# Patient Record
Sex: MALE | Race: WHITE | Employment: FULL TIME | URBAN - METROPOLITAN AREA
[De-identification: names, ages, dates, MRNs, and addresses within clinical notes are randomized per-mention and may not be internally consistent; named-entity substitution may affect disease eponyms.]

---

## 2017-05-03 NOTE — TELEPHONE ENCOUNTER
----- Message from [phone] sent at 5/3/2017  3:16 PM EDT -----  Regarding: Dr. Lyon Ponto: [phone]  Dad called stating pt has to switch from Novolog to Humalog because insurance does not cover it. Please call sneha [phone].

## 2017-05-03 NOTE — TELEPHONE ENCOUNTER
Spoke to the parent of Jeff Kerr. Informed father I would forward a refill request to . Encouraged father to set appt with MD since Jeff Kerr has not been seen since 8/16. Father verbalized understanding.

## 2017-05-04 RX ORDER — INSULIN LISPRO 100 [IU]/ML
INJECTION, SOLUTION INTRAVENOUS; SUBCUTANEOUS
Qty: 30 ML | Refills: 1 | Status: SHIPPED | OUTPATIENT
Start: 2017-05-04 | End: 2017-09-14 | Stop reason: SDUPTHER

## 2017-07-17 ENCOUNTER — APPOINTMENT (OUTPATIENT)
Dept: GENERAL RADIOLOGY | Age: 22
End: 2017-07-17
Attending: FAMILY MEDICINE

## 2017-07-17 ENCOUNTER — HOSPITAL ENCOUNTER (EMERGENCY)
Age: 22
Discharge: HOME | End: 2017-07-17
Attending: FAMILY MEDICINE

## 2017-07-17 VITALS
HEART RATE: 97 BPM | RESPIRATION RATE: 16 BRPM | DIASTOLIC BLOOD PRESSURE: 79 MMHG | WEIGHT: 141 LBS | BODY MASS INDEX: 20.19 KG/M2 | SYSTOLIC BLOOD PRESSURE: 148 MMHG | OXYGEN SATURATION: 98 % | TEMPERATURE: 97.5 F | HEIGHT: 70 IN

## 2017-07-17 DIAGNOSIS — M25.512 PAIN IN JOINT OF LEFT SHOULDER: Primary | ICD-10-CM

## 2017-07-17 NOTE — DISCHARGE INSTRUCTIONS
Arm Pain in Children: Care Instructions  Your Care Instructions  Your child can hurt his or her arm by using it too much or by injuring it. Biking and wrestling are examples of activities that can lead to arm pain. Everyday wear and tear, especially as your child gets older, can cause arm pain. Your child's forearms, wrists, hands, and fingers are the parts of the arm that are most likely to become painful. A minor arm injury usually will heal on its own with home treatment to relieve swelling and pain. If your child has a more serious injury, he or she may need tests and treatment. Follow-up care is a key part of your child's treatment and safety. Be sure to make and go to all appointments, and call your doctor if your child is having problems. It's also a good idea to know your child's test results and keep a list of the medicines your child takes. How can you care for your child at home? · Give pain medicines exactly as directed. ¨ If the doctor gave your child a prescription medicine for pain, give it as prescribed. ¨ If your child is not taking a prescription pain medicine, ask your doctor if your child can take an over-the-counter medicine. · Make sure your child rests and protects the arm. Have your child take a break from any activity that may cause pain. · Put ice or a cold pack on the arm for 10 to 20 minutes at a time. Put a thin cloth between the ice and your child's skin. · Prop up the sore arm on a pillow when icing it or anytime your child sits or lies down during the next 3 days. Try to keep the arm above the level of your child's heart. This will help reduce swelling. · If your doctor recommends a sling to support the arm, make sure your child wears it as directed. When should you call for help? Call your doctor now or seek immediate medical care if:  · Your child's arm or hand is cool or pale or changes color. · Your child cannot use the arm.   · Your child has signs of infection, such as:  ¨ Increased pain, swelling, warmth, or redness. ¨ Red streaks running up or down the arm. ¨ Pus draining from an area of the arm. ¨ A fever. · Your child has tingling, weakness, or numbness in the arm. Watch closely for changes in your child's health, and be sure to contact your doctor if:  · Your child does not get better as expected. Where can you learn more? Go to http://michael-elaine.info/. Enter [id number] in the search box to learn more about \"Arm Pain in Children: Care Instructions. \"  Current as of: March 20, 2017  Content Version: 11.3  © 1694-3709 [employer]. Care instructions adapted under license by [employer] (which disclaims liability or warranty for this information). If you have questions about a medical condition or this instruction, always ask your healthcare professional. [address] any warranty or liability for your use of this information.

## 2017-07-24 ENCOUNTER — OFFICE VISIT (OUTPATIENT)
Dept: PEDIATRIC ENDOCRINOLOGY | Age: 22
End: 2017-07-24

## 2017-07-24 VITALS
WEIGHT: 136 LBS | SYSTOLIC BLOOD PRESSURE: 116 MMHG | BODY MASS INDEX: 19.47 KG/M2 | DIASTOLIC BLOOD PRESSURE: 75 MMHG | TEMPERATURE: 98.1 F | OXYGEN SATURATION: 98 % | HEIGHT: 70 IN | HEART RATE: 101 BPM

## 2017-07-24 DIAGNOSIS — E10.9 TYPE 1 DIABETES MELLITUS WITHOUT COMPLICATION (HCC): Primary | ICD-10-CM

## 2017-07-24 LAB — HBA1C MFR BLD HPLC: 13.4 %

## 2017-07-24 NOTE — LETTER
7/25/2017 2:04 PM 
Chief Complaint Patient presents with  Diabetes  
  follow up [address]. 
[address] [facility] [address] [facility], [address] 
[phone] Cc: Type 1 diabetes On insulin pump: Medtronic [facility]: Flaquito Singletary is a 23 y.o.  male who presents for follow up evaluation of Type 1 diabetes mellitus. The initial diagnosis of diabetes was made in 2002. clinical course has worsened as indicated by A1c. Interval medical history:none Hospital admissions since last visit:no ED visits since last visit:no Compliance with blood gucose monitoring: inadequate . Checking 0.6 blood sugars per day. Insulin dosage review suggested noncompliance some of the time. Associated symptoms of hyperglycemia have included : excessive thirst . Associated symptoms of hypoglycemia have included: jitteriness. Treatment of low blood sugar: appropriate. He is currently taking:  through : insulin pump: Humalog Basal rates:  
12 midnight: 0.9 u/hr, 3 am: 1.3 u /hr, 8am  : 1.5 u/hour, 12 noon: 1.3 u/hour Total basal insulin per day: 30.8 units/day. Total average insulin per day: 45.3 units/day 
  
Target blood sugar: 100-130 mg/dl,. Carbohydrate ratio breakfast: 1: 10, lunch: 1: 10, dinner:1:8, Insulin Sensitivity factor/ glucose correction: breakfast: 1: 30 Lunch: 1: 30, dinner:1:30  
  
 
Change of insulin insertion sites: every 3 days. Any problems with insertion sites: no The patient  does not perform independently. 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 CGMS:no Last eye exam:overdue MedicAlert Identification Noted? no 
 
Past Medical History:  
Diagnosis Date  DM type 1 (diabetes mellitus, type 1) ([address]  Vitamin D deficiency Past Surgical History: Procedure Laterality Date  HX TYMPANOSTOMY  1999 Family History Problem Relation Age of Onset  Alcohol abuse Paternal Grandfather   
  cancer, type 2  Thyroid Disease Paternal Grandmother  Diabetes Neg Hx Current Outpatient Prescriptions Medication Sig Dispense Refill  insulin lispro (HUMALOG) 100 unit/mL injection Use as directed, infuse via insulin pump, max dose 100 units per day. Appt required for refills. 30 mL 1  
 glucose blood VI test strips (CONTOUR NEXT STRIPS) strip Use to check blood sugar up to 10 times daily 1 Package 6  
 glucose blood VI test strips (ONE TOUCH ULTRA TEST) strip To use up to 10 times daily 3 Package 6  Lancets (ONE TOUCH DELICA) Misc To use up to 100 units daily 1 Package 11  
 glucagon 1 mg Kit 1 mg by Injection route once as needed for 1 dose. 2 Kit 12  
 insulin aspart (NOVOLOG) 100 unit/mL injection Use as directed, infuse via insulin pump, max dose 100 units per day. Appt required for refills. 3 Vial 3 Allergies Allergen Reactions  Latex Rash Social History Social History  Marital status: SINGLE Spouse name: N/A  
 Number of children: N/A  
 Years of education: N/A Occupational History  Not on file. Social History Main Topics  Smoking status: Current Every Day Smoker  Smokeless tobacco: Never Used  Alcohol use No  
 Drug use: No  
 Sexual activity: No  
 
Other Topics Concern  Not on file Social History Narrative Review of Systems Constitutional: good energy, Eye: normal vision, denied double vision, photophobia, blurred vision Respiratory system: no wheezing, no respiratory discomfort CVS: no palpitations, no pedal edema GI: normal bowel movements, no abdominal pain Allergy: no skin rash or angioedema Neurological: no headache, no focal weakness, burning sensation of feet: no, Behavioural: no 
Skin: no rash or itching, injection sites: no.  
Objective:  
 
Visit Vitals  /75 (BP 1 Location: Right arm, BP Patient Position: Sitting)  Pulse (!) 101  Temp 98.1 °F (36.7 °C) (Oral)  Ht 5' 10.16\" (1.782 m)  Wt 136 lb (61.7 kg)  SpO2 98%  BMI 19.43 kg/m2 Wt Readings from Last 3 Encounters:  
07/24/17 136 lb (61.7 kg) 07/17/17 141 lb (64 kg) 09/13/16 135 lb (61.2 kg) (19 %, Z= -0.88)* * Growth percentiles are based on [facility] 2-20 Years data. Ht Readings from Last 3 Encounters:  
07/24/17 5' 10.16\" (1.782 m) 07/17/17 5' 10\" (1.778 m) 09/13/16 5' 10.5\" (1.791 m) (63 %, Z= 0.33)* * Growth percentiles are based on [facility] 2-20 Years data. Body mass index is 19.43 kg/(m^2). Facility age limit for growth percentiles is 20 years. Facility age limit for growth percentiles is 20 years. Facility age limit for growth percentiles is 20 years. General:  Alert, cooperative, no distress, Oropharynx: normal  
 Eyes:  normal fundi Ears:  Not examined Neck: supple,  Thyroid normal in size and texture Lung: clear to auscultation bilaterally Heart:  regular rate and rhythm, S1, S2 normal, no murmur Abdomen: soft, non-tender. Bowel sounds normal. No masses,  no organomegaly Extremities: extremities normal, atraumatic, no cyanosis or edema Skin: Injection sites: clear, uses andomen Pulses: 2+ and symmetric Neuro: normal without focal findings Feet exam: normal sensation and circulation Lab Review Lab Results Component Value Date/Time Hemoglobin A1c (POC) 13.4 07/24/2017 04:27 PM  
 Hemoglobin A1c (POC) 9.8 08/31/2016 03:33 PM  
 Hemoglobin A1c (POC) 9.5 08/05/2015 03:54 PM  
  
No results found for: HBA1C, HGBE8, [id number] Lab Results Component Value Date/Time Glucose 268 10/16/2012 03:25 PM  
  
 
Lab Results Component Value Date/Time TSH 0.932 08/31/2016 12:00 AM  
 
Lab Results Component Value Date/Time  Cholesterol, total 145 08/31/2016 12:00 AM  
 HDL Cholesterol 33 08/31/2016 12:00 AM  
 LDL, calculated 86 08/31/2016 12:00 AM  
 VLDL, calculated 26 08/31/2016 12:00 AM  
 Triglyceride 132 08/31/2016 12:00 AM  
 
 
 
Assessment:  
Diabetes Mellitus type I, under poor control. Hypoglycemia:no A1c today:13.4 Plan: 1. Treatment changes:no, not enough data. Lantus dose for basal in case of pump failure: 31 units. 2.  Education:  interpretation of lab results, blood sugar goals, insulin adjustments and SMBG skills 3. Compliance at present is estimated to be inadequate. Efforts to improve compliance (if necessary) will be directed at bolusing for all meals. Long term complications, Sick day management, treatment of low blood sugars, use of glucagon for hypoglycemic seizures and unconsciousness reviewed. Change pump site every 3 days and rotation of insertion sites reviewed. Hemoglobin A1C reviewed. Correlation between A1C and long term complications like neuropathy, nephropathy and retinopathy reviewed. Acute complications like diabetes ketoacidosis and dehydration and electrolyte abnormalities discussed. Annual screen labs: due:  (TSH, Lipid profile, Urine microalbumin, celiac screen). Annual eye exam: stressed. Next exam due now Need to review the blood sugars periodically if blood sugars are out of range as discussed in the clinic Transition to adult care Total time with patient 30 minutes Time spent counseling greater than 50% Patient:  Thomas Motta YOB: 1995 Date of Visit: 7/24/2017 Dear Lkoi Ferreira MD 
[facility] [id number] [address] VIA Facsimile: [phone] 
 : Thank you for referring Mr. Regis Nava to me for evaluation/treatment. Below are the relevant portions of my assessment and plan of care. If you have questions, please do not hesitate to call me. I look forward to following Mr. Jacinto Wallace along with you.  
 
 
 
Sincerely, 
 
 
Chilo Jane MD

## 2017-07-24 NOTE — PROGRESS NOTES
[facility]  [address] [address]  [phone]        Cc: Type 1 diabetes          On insulin pump: [employer]onic    [facility]: Awa Vidal is a 23 y.o.  male who presents for follow up evaluation of Type 1 diabetes mellitus. The initial diagnosis of diabetes was made in 2002. clinical course has worsened as indicated by A1c. Interval medical history:none  Hospital admissions since last visit:no  ED visits since last visit:no    Compliance with blood gucose monitoring: inadequate . Checking 0.6 blood sugars per day. Insulin dosage review suggested noncompliance some of the time. Associated symptoms of hyperglycemia have included : excessive thirst .   Associated symptoms of hypoglycemia have included: jitteriness. Treatment of low blood sugar: appropriate. He is currently taking:  through : insulin pump: Humalog  Basal rates:   12 midnight: 0.9 u/hr, 3 am: 1.3 u /hr, 8am  : 1.5 u/hour, 12 noon: 1.3 u/hour  Total basal insulin per day: 30.8 units/day. Total average insulin per day: 45.3 units/day     Target blood sugar: 100-130 mg/dl,. Carbohydrate ratio breakfast: 1: 10, lunch: 1: 10, dinner:1:8,   Insulin Sensitivity factor/ glucose correction: breakfast: 1: 30 Lunch: 1: 30, dinner:1:30        Change of insulin insertion sites: every 3 days. Any problems with insertion sites: no  The patient  does not perform independently. 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 Blood glucose times and ranges: See scanned log .   CGMS:no     Last eye exam:overdue  MedicAlert Identification Noted? no    Past Medical History:   Diagnosis Date    DM type 1 (diabetes mellitus, type 1) ([address]     Vitamin D deficiency      Past Surgical History:   Procedure Laterality Date    HX TYMPANOSTOMY  1999     Family History   Problem Relation Age of Onset    Alcohol abuse Paternal Grandfather      cancer, type 2    Thyroid Disease Paternal Grandmother     Diabetes Neg Hx      Current Outpatient Prescriptions   Medication Sig Dispense Refill    insulin lispro (HUMALOG) 100 unit/mL injection Use as directed, infuse via insulin pump, max dose 100 units per day. Appt required for refills. 30 mL 1    glucose blood VI test strips (CONTOUR NEXT STRIPS) strip Use to check blood sugar up to 10 times daily 1 Package 6    glucose blood VI test strips (ONE TOUCH ULTRA TEST) strip To use up to 10 times daily 3 Package 6    Lancets (ONE TOUCH DELICA) Misc To use up to 100 units daily 1 Package 11    glucagon 1 mg Kit 1 mg by Injection route once as needed for 1 dose. 2 Kit 12    insulin aspart (NOVOLOG) 100 unit/mL injection Use as directed, infuse via insulin pump, max dose 100 units per day. Appt required for refills. 3 Vial 3     Allergies   Allergen Reactions    Latex Rash     Social History     Social History    Marital status: SINGLE     Spouse name: N/A    Number of children: N/A    Years of education: N/A     Occupational History    Not on file.      Social History Main Topics    Smoking status: Current Every Day Smoker    Smokeless tobacco: Never Used    Alcohol use No    Drug use: No    Sexual activity: No     Other Topics Concern    Not on file     Social History Narrative     Review of Systems  Constitutional: good energy,   Eye: normal vision, denied double vision, photophobia, blurred vision  Respiratory system: no wheezing, no respiratory discomfort  CVS: no palpitations, no pedal edema  GI: normal bowel movements, no abdominal pain  Allergy: no skin rash or angioedema  Neurological: no headache, no focal weakness, burning sensation of feet: no, Behavioural: no  Skin: no rash or itching, injection sites: no.   Objective:     Visit Vitals    /75 (BP 1 Location: Right arm, BP Patient Position: Sitting)    Pulse (!) 101    Temp 98.1 °F (36.7 °C) (Oral)    Ht 5' 10.16\" (1.782 m)    Wt 136 lb (61.7 kg)    SpO2 98%    BMI 19.43 kg/m2     Wt Readings from Last 3 Encounters:   07/24/17 136 lb (61.7 kg)   07/17/17 141 lb (64 kg)   09/13/16 135 lb (61.2 kg) (19 %, Z= -0.88)*     * Growth percentiles are based on [facility] 2-20 Years data. Ht Readings from Last 3 Encounters:   07/24/17 5' 10.16\" (1.782 m)   07/17/17 5' 10\" (1.778 m)   09/13/16 5' 10.5\" (1.791 m) (63 %, Z= 0.33)*     * Growth percentiles are based on [facility] 2-20 Years data. Body mass index is 19.43 kg/(m^2). Facility age limit for growth percentiles is 20 years. Facility age limit for growth percentiles is 20 years. Facility age limit for growth percentiles is 20 years. General:  Alert, cooperative, no distress,    Oropharynx: normal    Eyes:  normal fundi    Ears:  Not examined   Neck: supple,  Thyroid normal in size and texture       Lung: clear to auscultation bilaterally   Heart:  regular rate and rhythm, S1, S2 normal, no murmur   Abdomen: soft, non-tender.  Bowel sounds normal. No masses,  no organomegaly   Extremities: extremities normal, atraumatic, no cyanosis or edema   Skin: Injection sites: clear, uses andomen   Pulses: 2+ and symmetric   Neuro: normal without focal findings      Feet exam: normal sensation and circulation         Lab Review  Lab Results   Component Value Date/Time    Hemoglobin A1c (POC) 13.4 07/24/2017 04:27 PM    Hemoglobin A1c (POC) 9.8 08/31/2016 03:33 PM    Hemoglobin A1c (POC) 9.5 08/05/2015 03:54 PM      No results found for: HBA1C, HGBE8, [id number]   Lab Results   Component Value Date/Time    Glucose 268 10/16/2012 03:25 PM        Lab Results   Component Value Date/Time    TSH 0.932 08/31/2016 12:00 AM     Lab Results   Component Value Date/Time    Cholesterol, total 145 08/31/2016 12:00 AM    HDL Cholesterol 33 08/31/2016 12:00 AM    LDL, calculated 86 08/31/2016 12:00 AM    VLDL, calculated 26 08/31/2016 12:00 AM    Triglyceride 132 08/31/2016 12:00 AM         Assessment:   Diabetes Mellitus type I, under poor control. Hypoglycemia:no  A1c today:13.4  Plan: 1. Treatment changes:no, not enough data. Lantus dose for basal in case of pump failure: 31 units. 2.  Education:  interpretation of lab results, blood sugar goals, insulin adjustments and SMBG skills  3. Compliance at present is estimated to be inadequate. Efforts to improve compliance (if necessary) will be directed at bolusing for all meals. Long term complications, Sick day management, treatment of low blood sugars, use of glucagon for hypoglycemic seizures and unconsciousness reviewed. Change pump site every 3 days and rotation of insertion sites reviewed. Hemoglobin A1C reviewed. Correlation between A1C and long term complications like neuropathy, nephropathy and retinopathy reviewed. Acute complications like diabetes ketoacidosis and dehydration and electrolyte abnormalities discussed. Annual screen labs: due:  (TSH, Lipid profile, Urine microalbumin, celiac screen). Annual eye exam: stressed.  Next exam due now  Need to review the blood sugars periodically if blood sugars are out of range as discussed in the clinic  Transition to adult care    Total time with patient 30 minutes  Time spent counseling greater than 50%

## 2017-09-21 RX ORDER — INSULIN LISPRO 100 [IU]/ML
INJECTION, SOLUTION INTRAVENOUS; SUBCUTANEOUS
Qty: 30 ML | Refills: 1
Start: 2017-09-21 | End: 2017-09-21 | Stop reason: SDUPTHER

## 2017-09-21 RX ORDER — INSULIN LISPRO 100 [IU]/ML
INJECTION, SOLUTION INTRAVENOUS; SUBCUTANEOUS
Qty: 30 ML | Refills: 1 | Status: SHIPPED | OUTPATIENT
Start: 2017-09-21 | End: 2017-12-21 | Stop reason: SDUPTHER

## 2017-11-26 ENCOUNTER — HOSPITAL ENCOUNTER (EMERGENCY)
Age: 22
Discharge: LEFT BEFORE DISCHARGE | End: 2017-11-26
Attending: FAMILY MEDICINE

## 2017-11-27 ENCOUNTER — HOSPITAL ENCOUNTER (EMERGENCY)
Age: 22
Discharge: HOME | End: 2017-11-27
Attending: FAMILY MEDICINE

## 2017-11-27 VITALS
OXYGEN SATURATION: 98 % | HEART RATE: 87 BPM | TEMPERATURE: 98.3 F | BODY MASS INDEX: 21.19 KG/M2 | DIASTOLIC BLOOD PRESSURE: 73 MMHG | WEIGHT: 139.8 LBS | HEIGHT: 68 IN | SYSTOLIC BLOOD PRESSURE: 127 MMHG | RESPIRATION RATE: 18 BRPM

## 2017-11-27 DIAGNOSIS — H66.003 ACUTE SUPPURATIVE OTITIS MEDIA OF BOTH EARS WITHOUT SPONTANEOUS RUPTURE OF TYMPANIC MEMBRANES, RECURRENCE NOT SPECIFIED: Primary | ICD-10-CM

## 2017-11-27 RX ORDER — AMOXICILLIN AND CLAVULANATE POTASSIUM 875; 125 MG/1; MG/1
1 TABLET, FILM COATED ORAL EVERY 12 HOURS
Qty: 20 TAB | Refills: 0 | Status: SHIPPED | OUTPATIENT
Start: 2017-11-27 | End: 2017-12-07

## 2017-11-27 NOTE — DISCHARGE INSTRUCTIONS
Ear Infection (Otitis Media): Care Instructions  Your Care Instructions    An ear infection may start with a cold and affect the middle ear (otitis media). It can hurt a lot. Most ear infections clear up on their own in a couple of days. Most often you will not need antibiotics. This is because many ear infections are caused by a virus. Antibiotics don't work against a virus. Regular doses of pain medicines are the best way to reduce your fever and help you feel better. Follow-up care is a key part of your treatment and safety. Be sure to make and go to all appointments, and call your doctor if you are having problems. It's also a good idea to know your test results and keep a list of the medicines you take. How can you care for yourself at home? · Take pain medicines exactly as directed. ¨ If the doctor gave you a prescription medicine for pain, take it as prescribed. ¨ If you are not taking a prescription pain medicine, take an over-the-counter medicine, such as acetaminophen (Tylenol), ibuprofen (Advil, Motrin), or naproxen (Aleve). Read and follow all instructions on the label. ¨ Do not take two or more pain medicines at the same time unless the doctor told you to. Many pain medicines have acetaminophen, which is Tylenol. Too much acetaminophen (Tylenol) can be harmful. · Plan to take a full dose of pain reliever before bedtime. Getting enough sleep will help you get better. · Try a warm, moist washcloth on the ear. It may help relieve pain. · If your doctor prescribed antibiotics, take them as directed. Do not stop taking them just because you feel better. You need to take the full course of antibiotics. When should you call for help? Call your doctor now or seek immediate medical care if:  ? · You have new or increasing ear pain. ? · You have new or increasing pus or blood draining from your ear. ? · You have a fever with a stiff neck or a severe headache. ? Watch closely for changes in your health, and be sure to contact your doctor if:  ? · You have new or worse symptoms. ? · You are not getting better after taking an antibiotic for 2 days. Where can you learn more? Go to http://michael-elaine.info/. Enter M989 in the search box to learn more about \"Ear Infection (Otitis Media): Care Instructions. \"  Current as of: May 12, 2017  Content Version: 11.4  © 1370-8444 [employer]. Care instructions adapted under license by [employer] (which disclaims liability or warranty for this information). If you have questions about a medical condition or this instruction, always ask your healthcare professional. [place] [address] any warranty or liability for your use of this information.

## 2017-11-27 NOTE — UC PROVIDER NOTE
Patient is a 23 y.o. male presenting with ear congestion. The history is provided by the patient. Ear Fullness    This is a new problem. The current episode started 2 days ago. The problem occurs constantly. The problem has been gradually worsening. Patient complains that both ears are affected. There has been no fever. The pain is mild. Associated symptoms include hearing loss, rhinorrhea, sore throat and cough. Pertinent negatives include no ear discharge, no headaches, no vomiting and no rash. Past Medical History:   Diagnosis Date    DM type 1 (diabetes mellitus, type 1) ([facility] [address]     Vitamin D deficiency         Past Surgical History:   Procedure Laterality Date    HX TYMPANOSTOMY  1999         Family History   Problem Relation Age of Onset    Alcohol abuse Paternal Grandfather      cancer, type 2    Thyroid Disease Paternal Grandmother     Diabetes Neg Hx         Social History     Social History    Marital status: SINGLE     Spouse name: N/A    Number of children: N/A    Years of education: N/A     Occupational History    Not on file. Social History Main Topics    Smoking status: Current Every Day Smoker    Smokeless tobacco: Never Used    Alcohol use No    Drug use: No    Sexual activity: No     Other Topics Concern    Not on file     Social History Narrative                ALLERGIES: Latex    Review of Systems   Constitutional: Negative for chills and fever. HENT: Positive for congestion, hearing loss, rhinorrhea and sore throat. Negative for ear discharge. Respiratory: Positive for cough. Negative for shortness of breath and wheezing. Cardiovascular: Negative for chest pain and palpitations. Gastrointestinal: Negative for nausea and vomiting. Musculoskeletal: Negative for myalgias. Skin: Negative for rash. Neurological: Negative for headaches.        Vitals:    11/27/17 1148   BP: 127/73   Pulse: 87   Resp: 18   Temp: 98.3 °F (36.8 °C)   SpO2: 98%   Weight: 63.4 kg (139 lb 12.8 oz)   Height: 5' 8\" (1.727 m)       Physical Exam   Constitutional: He appears well-developed and well-nourished. No distress. HENT:   Right Ear: External ear and ear canal normal. Tympanic membrane is erythematous and bulging. Left Ear: External ear and ear canal normal. Tympanic membrane is erythematous and bulging. Nose: Rhinorrhea present. Right sinus exhibits no maxillary sinus tenderness and no frontal sinus tenderness. Left sinus exhibits no maxillary sinus tenderness and no frontal sinus tenderness. Mouth/Throat: Mucous membranes are normal. Posterior oropharyngeal erythema present. No oropharyngeal exudate, posterior oropharyngeal edema or tonsillar abscesses. Cardiovascular: Normal rate, regular rhythm and normal heart sounds. Pulmonary/Chest: Effort normal and breath sounds normal. No respiratory distress. He has no wheezes. He has no rales. Lymphadenopathy:     He has cervical adenopathy. Neurological: He is alert. Skin: He is not diaphoretic. Psychiatric: He has a normal mood and affect. His behavior is normal. Judgment and thought content normal.   Nursing note and vitals reviewed. MDM     Differential Diagnosis; Clinical Impression; Plan:     CLINICAL IMPRESSION:  Acute suppurative otitis media of both ears without spontaneous rupture of tympanic membranes, recurrence not specified  (primary encounter diagnosis)    Plan:  1. Augmentin  2. Tylenol prn  3. ENT/PCP if no improvement  Risk of Significant Complications, Morbidity, and/or Mortality:   Presenting problems: Moderate  Management options:   Moderate  Progress:   Patient progress:  Stable      Procedures

## 2017-12-21 ENCOUNTER — OFFICE VISIT (OUTPATIENT)
Dept: ENDOCRINOLOGY | Age: 22
End: 2017-12-21

## 2017-12-21 VITALS
WEIGHT: 129.6 LBS | SYSTOLIC BLOOD PRESSURE: 99 MMHG | DIASTOLIC BLOOD PRESSURE: 69 MMHG | HEART RATE: 102 BPM | BODY MASS INDEX: 19.64 KG/M2 | HEIGHT: 68 IN

## 2017-12-21 DIAGNOSIS — E10.9 TYPE 1 DIABETES MELLITUS WITHOUT COMPLICATION (HCC): Primary | ICD-10-CM

## 2017-12-21 LAB — HBA1C MFR BLD HPLC: 11.9 %

## 2017-12-21 RX ORDER — INSULIN LISPRO 100 [IU]/ML
INJECTION, SOLUTION INTRAVENOUS; SUBCUTANEOUS
Qty: 5 VIAL | Refills: 3 | Status: SHIPPED | OUTPATIENT
Start: 2017-12-21 | End: 2018-01-08 | Stop reason: CLARIF

## 2017-12-21 NOTE — MR AVS SNAPSHOT
Visit Information Date & Time Provider Department Dept. Phone Encounter #  
 12/21/2017  2:50 PM Lenka Stock, [address] [facility] Diabetes and Endocrinology [phone] [id number] Follow-up Instructions Return in about 2 months (around 2/21/2018). Upcoming Health Maintenance Date Due Hepatitis A Peds Age 1-18 (1 of 2 - Standard Series) 4/6/1998 DTaP/Tdap/Td series (1 - Tdap) 4/6/2004 FOOT EXAM Q1 4/6/2007 HPV AGE 9Y-34Y (1 of 3 - Male 3 Dose Series) 4/6/2008 Pneumococcal 19-64 Medium Risk (1 of 1 - PPSV23) 4/6/2016 EYE EXAM RETINAL OR DILATED Q1 8/10/2016 Influenza Age 5 to Adult 8/1/2017 LIPID PANEL Q1 8/31/2017 MICROALBUMIN Q1 11/18/2017 HEMOGLOBIN A1C Q6M 1/24/2018 Allergies as of 12/21/2017  Review Complete On: 12/21/2017 By: Lenka Stock MD  
  
 Severity Noted Reaction Type Reactions Latex  10/01/2011   Side Effect Rash Current Immunizations  Never Reviewed No immunizations on file. Not reviewed this visit You Were Diagnosed With   
  
 Codes Comments Type 1 diabetes mellitus without complication (HCC)    -  Primary ICD-10-CM: E10.9 ICD-9-CM: 250.01 Vitals BP Pulse Height(growth percentile) Weight(growth percentile) BMI Smoking Status 99/69 (BP 1 Location: Left arm, BP Patient Position: Sitting) (!) 102 5' 8\" (1.727 m) 129 lb 9.6 oz (58.8 kg) 19.71 kg/m2 Former Smoker BMI and BSA Data Body Mass Index Body Surface Area  
 19.71 kg/m 2 1.68 m 2 Preferred Pharmacy Pharmacy Name Phone [facility] [address] [facility], [address] [place] [address] AT [address] [place] [address] [phone] Your Updated Medication List  
  
   
This list is accurate as of: 12/21/17  2:58 PM.  Always use your most recent med list.  
  
  
  
  
 glucagon 1 mg Kit  
1 mg by Injection route once as needed for 1 dose. glucose blood VI test strips strip Commonly known as:  CONTOUR NEXT STRIPS Use to check blood sugar up to 10 times daily, E10.9  
  
 insulin lispro 100 unit/mL injection Commonly known as:  HumaLOG Use as directed, infuse via insulin pump, max dose 100 units per day. Lancets Misc Commonly known as: One Touch Kayden Rupal To use up to 100 units daily Prescriptions Sent to Pharmacy Refills  
 insulin lispro (HUMALOG) 100 unit/mL injection 3 Sig: Use as directed, infuse via insulin pump, max dose 100 units per day. Class: Normal  
 Pharmacy: [facility] [address] AT [facility] Ph #: [phone]  
 glucose blood VI test strips (CONTOUR NEXT STRIPS) strip 3 Sig: Use to check blood sugar up to 10 times daily, E10.9 Class: Normal  
 Pharmacy: [facility] [address] [facility] [facility] Ph #: [phone] We Performed the Following AMB POC HEMOGLOBIN A1C [01746 CPT(R)] HM DIABETES FOOT EXAM [HM7 Custom] LIPID PANEL [16754 CPT(R)] METABOLIC PANEL, COMPREHENSIVE [78229 CPT(R)] MICROALBUMIN, UR, RAND W/ MICROALBUMIN/CREA RATIO [id number] CPT(R)] Follow-up Instructions Return in about 2 months (around 2/21/2018). Introducing [facility]! Dear Shantell Gip: 
Thank you for requesting a [employer] account. Our records indicate that you have previously registered for a [employer] account but its currently inactive. Please call our [employer] support line at [phone]. Additional Information If you have questions, please visit the Frequently Asked Questions section of the [employer] website at [email or web address] Remember, [employer] is NOT to be used for urgent needs. For medical emergencies, dial 911. Now available from your iPhone and Android! Please provide this summary of care documentation to your next provider. Your primary care clinician is listed as Carmenza Castillo. If you have any questions after today's visit, please call [phone].

## 2017-12-21 NOTE — PROGRESS NOTES
CONSULTATION REQUESTED BY: Dr. Quiana Fleming, Pediatric Endocrinology    REASON FOR CONSULT:  Type 1 diabetes, Evaluation and Recommendation    CHIEF COMPLAINT: Type 1 diabetes     HISTORY OF PRESENT ILLNESS:   Erna Ortiz is a 23 y.o. male with a PMHx as noted below who was referred to our endocrinology clinic for evaluation of type 1 diabetes. History of Type 1 Diabetes:  Patient reports that they were diagnosed with type 1 diabetes at age 13  Family history is negative for diabetes. Last evaluated, per records, by peds endo this past July, transitioning,  Using insulin pump since grade 4. Current home regimen includes:  Insulin pump: Medtronic Minimed  Type of insulin used: Humalog  Total daily insulin used approx.  45-50 units  Settings:  Basal: (u/hr)  12 AM: 0.9   3 AM: 1.3   8 AM: 1.5  12 Noon: 1.3   Bolus:  12 AM  1:8 CR  12:30 AM  1:0 CR  5:30 AM 1.8 CR    Sensitivity:  1:30 all times    HOME BLOOD SUGARS:     Checks < 1x/day, few times per week at the most     Yesterday checked at 8 PM, was 260     Typically always in the 200's     A1c today is 11.9%     Lows reported 1-2x/week, feels low when gets to 75    Screening:  Retinopathy: denies any, due for exam  Neuropathy:  Denies any  Feet: condition: None     Review of most recent diabetes-related labs:  Lab Results   Component Value Date    FND4ACJC 13.4 07/24/2017    CIO8LZCM 9.8 08/31/2016    DDM8UVMG 9.5 08/05/2015    CHOL 145 08/31/2016    LDLC 86 08/31/2016    GFRAA CANNOT BE CALCULATED 03/31/2012    GFRNA CANCELED 10/16/2012    MCACR 7.9 11/18/2016    TSH 0.932 08/31/2016    VITD3 25.8 (L) 11/05/2013     Lab Key:  [id number] = IA-2 pancreatic islet cell autoantibody  GADLT = JADE-65 autoantibody   MCACR = Urine Microalbumin  INSUL = Insulin level  CPEPL = C-peptide level      PAST MEDICAL/SURGICAL HISTORY:   Past Medical History:   Diagnosis Date    DM type 1 (diabetes mellitus, type 1) [facility] [address]     Vitamin D deficiency      Past Surgical History:   Procedure Laterality Date    HX TYMPANOSTOMY  1999       ALLERGIES:   Allergies   Allergen Reactions    Latex Rash       MEDICATIONS ON ADMISSION:     Current Outpatient Prescriptions:     insulin lispro (HUMALOG) 100 unit/mL injection, Use as directed, infuse via insulin pump, max dose 100 units per day., Disp: 30 mL, Rfl: 1    glucose blood VI test strips (CONTOUR NEXT STRIPS) strip, Use to check blood sugar up to 10 times daily, Disp: 1 Package, Rfl: 6    glucagon 1 mg Kit, 1 mg by Injection route once as needed for 1 dose., Disp: 2 Kit, Rfl: 12    glucose blood VI test strips (ONE TOUCH ULTRA TEST) strip, To use up to 10 times daily, Disp: 3 Package, Rfl: 6    Lancets (ONE TOUCH DELICA) Misc, To use up to 100 units daily, Disp: 1 Package, Rfl: 11    SOCIAL HISTORY:   Social History     Social History    Marital status: SINGLE     Spouse name: N/A    Number of children: N/A    Years of education: N/A     Occupational History    Not on file. Social History Main Topics    Smoking status: Former Smoker     Quit date: 8/28/2017    Smokeless tobacco: Current User      Comment: VAPES    Alcohol use No    Drug use: No    Sexual activity: No     Other Topics Concern    Not on file     Social History Narrative       FAMILY HISTORY:  Family History   Problem Relation Age of Onset    Alcohol abuse Paternal Grandfather      cancer, type 2    Thyroid Disease Paternal Grandmother     Diabetes Neg Hx        REVIEW OF SYSTEMS: Complete ROS assessed and noted for that which is described above, all else are negative.   Eyes: normal  ENT: normal  CVS: normal  Resp: normal  GI: normal  : normal  GYN: normal  Endocrine: normal  Integument: normal  Musculoskeletal: normal  Neuro: normal  Psych: normal      PHYSICAL EXAMINATION:    VITAL SIGNS:  Visit Vitals    BP 99/69 (BP 1 Location: Left arm, BP Patient Position: Sitting)    Pulse (!) 102    Ht 5' 8\" (1.727 m)    Wt 129 lb 9.6 oz (58.8 kg)    BMI 19.71 kg/m2       GENERAL: NCAT, Sitting comfortably, NAD  EYES: EOMI, non-icteric, no proptosis  Ear/Nose/Throat: NCAT, no inflammation, no masses  LYMPH NODES: No LAD  CARDIOVASCULAR: S1 S2, RRR, No murmur, 2+ radial pulses  RESPIRATORY: CTA b/l, no wheeze/rales  GASTROINTESTINAL: soft, obese, NT, ND, BS normal  MUSCULOSKELETAL: Normal ROM, no atrophy  SKIN: warm, no edema/rash/ or other skin changes  NEUROLOGIC: 5/5 power all extremities, no parasthesias, AAOx3  PSYCHIATRIC: Normal affect, Normal insight and judgement         Diabetic foot exam performed by Chencho Roman MD     Measurement  Response Nurse Comment Physician Comment   Monofilament  R - normal sensation with micro filament  L - normal sensation with micro filament     Pulse DP R - 2+ (normal)  L - 2+ (normal)     Vibration R - Normal    L - Normal     Structural deformity R - None  L - None     Skin Integrity / Deformity R - None  L - None        Reviewed by:             REVIEW OF LABORATORY AND RADIOLOGY DATA:   Labs and documentation have been reviewed as described above. ASSESSMENT AND PLAN:   Federica Riley is a 23 y.o. male with a PMHx as noted above who was referred to our endocrinology clinic for evaluation of type 1 diabetes. Type 1 Diabetes, Uncontrolled    Today we spent time reviewing the transitional experience going from pediatric care to adult endocrinology, with respect to the increasing responsibility and risk of complications with age. We reviewed together the importance of starting to check his blood sugar regularly in order that we make safe and logical changes to his pump settings where needed. Currently we have no knowledge of his blood sugars other than it being elevated in most cases. He feels comfortable with carb counting and believes that this is not an issue for him. He notes that his issue is mostly that he eats only one large meal in the evenings, but earlier in the day he starts snacking from 2 PM and on.  In light of this, we noted that it would be best that he consolidate his foods into 3 regular meals which can be treated, and can help reduce hunger/snacking/grazing. He demonstrated his understanding and I have expressed that I am willing to work closely with him to catch him up to where he needs to be. I have requested that he start checking his blood sugars several times per day, premeal and bedtime, and to send me a log to review in 1 week, of which I will review and contact him by phone to recommend any changes. Plan: Type 1 Diabetes  Medications:  No changes to pump, Glucose log to be sent to me in 1 week after starting to check. Labs: Full Diabetes Labs today    Screening:  Feet: Foot exam completed today  Eyes: Advised that he go for his annual eye exam not yet completed    BP: Today his BP is normal  Lipids: No known hx of dyslipidemia, will check labs    RTC: I would like to see them back in 2 months, log in 1 week      Adelso HERMAN  [address] [facility]

## 2017-12-22 LAB
ALBUMIN SERPL-MCNC: 5.1 G/DL (ref 3.5–5.5)
ALBUMIN/CREAT UR: 269.7 MG/G CREAT (ref 0–30)
ALBUMIN/GLOB SERPL: 2 {RATIO} (ref 1.2–2.2)
ALP SERPL-CCNC: 97 IU/L (ref 39–117)
ALT SERPL-CCNC: 5 IU/L (ref 0–44)
AST SERPL-CCNC: 16 IU/L (ref 0–40)
BILIRUB SERPL-MCNC: 0.5 MG/DL (ref 0–1.2)
BUN SERPL-MCNC: 16 MG/DL (ref 6–20)
BUN/CREAT SERPL: 18 (ref 9–20)
CALCIUM SERPL-MCNC: 9.9 MG/DL (ref 8.7–10.2)
CHLORIDE SERPL-SCNC: 96 MMOL/L (ref 96–106)
CHOLEST SERPL-MCNC: 210 MG/DL (ref 100–199)
CO2 SERPL-SCNC: 25 MMOL/L (ref 18–29)
CREAT SERPL-MCNC: 0.87 MG/DL (ref 0.76–1.27)
CREAT UR-MCNC: 268 MG/DL
GLOBULIN SER CALC-MCNC: 2.5 G/DL (ref 1.5–4.5)
GLUCOSE SERPL-MCNC: 191 MG/DL (ref 65–99)
HDLC SERPL-MCNC: 37 MG/DL
INTERPRETATION, 910389: NORMAL
LDLC SERPL CALC-MCNC: 142 MG/DL (ref 0–99)
MICROALBUMIN UR-MCNC: 722.9 UG/ML
POTASSIUM SERPL-SCNC: 4.4 MMOL/L (ref 3.5–5.2)
PROT SERPL-MCNC: 7.6 G/DL (ref 6–8.5)
SODIUM SERPL-SCNC: 140 MMOL/L (ref 134–144)
TRIGL SERPL-MCNC: 153 MG/DL (ref 0–149)
VLDLC SERPL CALC-MCNC: 31 MG/DL (ref 5–40)

## 2017-12-22 NOTE — PROGRESS NOTES
Urine microalbumin and cholesterol elevated above goal,  Will need to discuss role of statin on f/u,    Amie Rodríguez.  [address] [facility] Endocrinology  [address] [facility]

## 2017-12-26 NOTE — TELEPHONE ENCOUNTER
Received a fax from [facility] [address] requesting a refill for alternative medication for Contour next test strips. Per Rx benefit plan alternative medication include: Accu-Check comfort curv, one touch ultra.

## 2018-01-08 RX ORDER — INSULIN ASPART 100 [IU]/ML
INJECTION, SOLUTION INTRAVENOUS; SUBCUTANEOUS
Qty: 5 VIAL | Refills: 3 | Status: SHIPPED | OUTPATIENT
Start: 2018-01-08 | End: 2018-03-16 | Stop reason: SDUPTHER

## 2018-01-31 ENCOUNTER — OFFICE VISIT (OUTPATIENT)
Dept: PRIMARY CARE CLINIC | Age: 23
End: 2018-01-31

## 2018-01-31 VITALS
TEMPERATURE: 100.1 F | SYSTOLIC BLOOD PRESSURE: 126 MMHG | BODY MASS INDEX: 19.44 KG/M2 | OXYGEN SATURATION: 97 % | WEIGHT: 135.8 LBS | DIASTOLIC BLOOD PRESSURE: 79 MMHG | HEIGHT: 70 IN | HEART RATE: 96 BPM | RESPIRATION RATE: 18 BRPM

## 2018-01-31 DIAGNOSIS — J11.1 INFLUENZA: ICD-10-CM

## 2018-01-31 DIAGNOSIS — R68.89 FLU-LIKE SYMPTOMS: Primary | ICD-10-CM

## 2018-01-31 LAB
QUICKVUE INFLUENZA TEST: POSITIVE
VALID INTERNAL CONTROL?: YES

## 2018-01-31 RX ORDER — INSULIN LISPRO 100 [IU]/ML
INJECTION, SOLUTION INTRAVENOUS; SUBCUTANEOUS
COMMUNITY
Start: 2017-12-21 | End: 2018-03-19 | Stop reason: ALTCHOICE

## 2018-01-31 RX ORDER — OSELTAMIVIR PHOSPHATE 75 MG/1
75 CAPSULE ORAL 2 TIMES DAILY
Qty: 10 CAP | Refills: 0 | Status: SHIPPED | OUTPATIENT
Start: 2018-01-31 | End: 2018-02-05

## 2018-01-31 NOTE — PROGRESS NOTES
Claude Ferrari is a 23 y.o. male who presents for nasla congestion, body aches, mild coughing, nausea, chills. Started 2 days ago with nausea, however bodyaches and chills started today. He has not tried any meds. He has not received flu vaccine. No sore throat or ear pain. Poor appetite but pushing fluids. Past Medical History:   Diagnosis Date    DM type 1 (diabetes mellitus, type 1) ([address]     Vitamin D deficiency      Past Surgical History:   Procedure Laterality Date    HX TYMPANOSTOMY  1999       Meds:   Current Outpatient Prescriptions   Medication Sig Dispense Refill    HUMALOG 100 unit/mL injection       insulin aspart (NOVOLOG) 100 unit/mL injection Use as directed, infuse via Insulin Pump, Max Dose 100 units per day 5 Vial 3    glucose blood VI test strips (ASCENSIA AUTODISC VI, ONE TOUCH ULTRA TEST VI) strip Check blood sugar 4x/day, Diagnosis E10.9 400 Strip 3    Lancets (ONE TOUCH DELICA) Misc To use up to 100 units daily 1 Package 11    glucagon 1 mg Kit 1 mg by Injection route once as needed for 1 dose. 2 Kit 12    glucose blood VI test strips (CONTOUR NEXT STRIPS) strip Use to check blood sugar up to 10 times daily, E10.9 400 Strip 3       Allergies:    Allergies   Allergen Reactions    Latex Rash       Smoker:  History   Smoking Status    Former Smoker    Quit date: 8/28/2017   Smokeless Tobacco    Current User     Comment: VAPES       ETOH:   History   Alcohol Use No       FH:   Family History   Problem Relation Age of Onset    Alcohol abuse Paternal Grandfather      cancer, type 2    Thyroid Disease Paternal Grandmother     Diabetes Neg Hx        ROS:  General/Constitutional:   No weight loss or weight gain       Eyes:   No redness, pruritis, pain, visual changes, swelling, or discharge      Ears:    No pain, loss or changes in hearing     Nose: Nasal congestion and rhinorrea  Neck:   No swelling, masses, stiffness, pain, or limited movement     Cardiac:    No chest pain Respiratory:  cough   GI:   No vomiting, diarrhea, abdominal pain, bloody or dark stools       Skin: No rash     Physical Exam:  Visit Vitals    /79 (BP 1 Location: Left arm, BP Patient Position: Sitting)    Pulse 96    Temp 100.1 °F (37.8 °C) (Oral)    Resp 18    Ht 5' 10\" (1.778 m)    Wt 135 lb 12.8 oz (61.6 kg)    SpO2 97%    BMI 19.49 kg/m2     General: Alert and oriented, ill appearing. Responds to all questions appropriately. SKIN: No rash. Normal color. HEAD: No sinus tenderness. EYES: Conjunctiva are clear; pupils round and reactive to light. EARS: External normal, canals clear, tympanic membranes normal.  NOSE: Edema, erythema, clear mucous drainage. OROPHARYNX: Slight tonsil edema, erythema, no exudate. NECK: Supple; no masses; normal lymphadenopathy. LUNGS: Respirations unlabored; clear to auscultation bilaterally, no wheeze, rales or rhonchi. CARDIOVASCULAR: Regular, rate, and rhythm without murmurs, gallops or rubs. EXTREMITIES: No edema, cyanosis or clubbing. NEUROLOGIC: Speech intact; face symmetrical; moves all extremities equally    Rapid influenza B positive. Assessment:    ICD-10-CM ICD-9-CM    1. Flu-like symptoms R68.89 780.99 AMB POC RAPID INFLUENZA TEST   2. Influenza J11.1 487.1 oseltamivir (TAMIFLU) 75 mg capsule     Start tamiflu. OTCs as below. Push PO fluids and advance diet. Return PRN. Plan:  Discharge instructions:  1. Combination cough and cold medicine such as Mucinex DM  2. Salt water gargle. 3. Plenty of fluids. 4. Ibuprofen (Motrin, Advil):  200mg - take 1-4 tables three times as needed for pain and fever   5. Acetaminophen (Tylenol):  500mg 1-2 tablets every 6 hours as needed for pain and fever. 6. Throat lozenges such as Halls as needed. 7. Humidifier as needed. Follow Up:  Get re-examined if not improved in  5-7 days or if symptoms worsen.      If you get suddenly worse, go to the nearest hospital Emergency Room      This note will not be viewable in MyChart.

## 2018-01-31 NOTE — MR AVS SNAPSHOT
[address] 
[phone] Patient: Claude Ferrari MRN: [id number] :1995 Visit Information Date & Time Provider Department Dept. Phone Encounter #  
 2018  7:00 PM Bee FuentesRober [id number] Your Appointments 2018  3:30 PM  
Follow Up with Kortney Hwang MD  
[facility] [address] [facility]) Appt Note: 2 month f/u  
 [address] [facility] [address] [id number] [address] [facility] Upcoming Health Maintenance Date Due Hepatitis A Peds Age 1-18 (1 of 2 - Standard Series) 1998 DTaP/Tdap/Td series (1 - Tdap) 2004 HPV AGE 9Y-34Y (1 of 3 - Male 3 Dose Series) 2008 Pneumococcal 19-64 Medium Risk (1 of 1 - PPSV23) 2016 EYE EXAM RETINAL OR DILATED Q1 8/10/2016 HEMOGLOBIN A1C Q6M 2018 FOOT EXAM Q1 2018 MICROALBUMIN Q1 2018 LIPID PANEL Q1 2018 Allergies as of 2018  Review Complete On: 2018 By: Bee Fuentes MD  
  
 Severity Noted Reaction Type Reactions Latex  10/01/2011   Side Effect Rash Current Immunizations  Never Reviewed No immunizations on file. Not reviewed this visit You Were Diagnosed With   
  
 Codes Comments Flu-like symptoms    -  Primary ICD-10-CM: R68.89 ICD-9-CM: 780.99 Influenza     ICD-10-CM: J11.1 ICD-9-CM: 487. 1 Vitals BP Pulse Temp Resp Height(growth percentile) Weight(growth percentile) 126/79 (BP 1 Location: Left arm, BP Patient Position: Sitting) 96 100.1 °F (37.8 °C) (Oral) 18 5' 10\" (1.778 m) 135 lb 12.8 oz (61.6 kg) SpO2 BMI Smoking Status 97% 19.49 kg/m2 Former Smoker BMI and BSA Data  Body Mass Index Body Surface Area  
 19.49 kg/m 2 1.74 m 2  
  
  
 Preferred Pharmacy Pharmacy Name Phone [facility] [address] [facility], [address]  [place] [phone] Your Updated Medication List  
  
   
This list is accurate as of: 1/31/18  7:06 PM.  Always use your most recent med list.  
  
  
  
  
 glucagon 1 mg Kit  
1 mg by Injection route once as needed for 1 dose. * glucose blood VI test strips strip Commonly known as:  CONTOUR NEXT STRIPS Use to check blood sugar up to 10 times daily, E10.9  
  
 * glucose blood VI test strips strip Commonly known as:  ASCENSIA AUTODISC VI, ONE TOUCH ULTRA TEST VI Check blood sugar 4x/day, Diagnosis E10.9 HumaLOG 100 unit/mL injection Generic drug:  insulin lispro  
  
 insulin aspart 100 unit/mL injection Commonly known as:  [place] Ron Use as directed, infuse via Insulin Pump, Max Dose 100 units per day Lancets Misc Commonly known as: One Touch Vera Crater To use up to 100 units daily  
  
 oseltamivir 75 mg capsule Commonly known as:  TAMIFLU Take 1 Cap by mouth two (2) times a day for 5 days. * Notice: This list has 2 medication(s) that are the same as other medications prescribed for you. Read the directions carefully, and ask your doctor or other care provider to review them with you. Prescriptions Sent to Pharmacy Refills  
 oseltamivir (TAMIFLU) 75 mg capsule 0 Sig: Take 1 Cap by mouth two (2) times a day for 5 days. Class: Normal  
 Pharmacy: [facility] [address] [place] [address], [address] #: [phone] Route: Oral  
  
We Performed the Following AMB POC RAPID INFLUENZA TEST [43251 CPT(R)] Patient Instructions Influenza (Flu): Care Instructions Your Care Instructions Influenza (flu) is an infection in the lungs and breathing passages. It is caused by the influenza virus.  There are different strains, or types, of the flu virus from year to year. Unlike the common cold, the flu comes on suddenly and the symptoms, such as a cough, congestion, fever, chills, fatigue, aches, and pains, are more severe. These symptoms may last up to 10 days. Although the flu can make you feel very sick, it usually doesn't cause serious health problems. Home treatment is usually all you need for flu symptoms. But your doctor may prescribe antiviral medicine to prevent other health problems, such as pneumonia, from developing. Older people and those who have a long-term health condition, such as lung disease, are most at risk for having pneumonia or other health problems. Follow-up care is a key part of your treatment and safety. Be sure to make and go to all appointments, and call your doctor if you are having problems. It's also a good idea to know your test results and keep a list of the medicines you take. How can you care for yourself at home? · Get plenty of rest. 
· Drink plenty of fluids, enough so that your urine is light yellow or clear like water. If you have kidney, heart, or liver disease and have to limit fluids, talk with your doctor before you increase the amount of fluids you drink. · Take an over-the-counter pain medicine if needed, such as acetaminophen (Tylenol), ibuprofen (Advil, Motrin), or naproxen (Aleve), to relieve fever, headache, and muscle aches. Read and follow all instructions on the label. No one younger than 20 should take aspirin. It has been linked to Reye syndrome, a serious illness. · Do not smoke. Smoking can make the flu worse. If you need help quitting, talk to your doctor about stop-smoking programs and medicines. These can increase your chances of quitting for good. · Breathe moist air from a hot shower or from a sink filled with hot water to help clear a stuffy nose. · Before you use cough and cold medicines, check the label.  These medicines may not be safe for young children or for people with certain health problems. · If the skin around your nose and lips becomes sore, put some petroleum jelly on the area. · To ease coughing: ¨ Drink fluids to soothe a scratchy throat. ¨ Suck on cough drops or plain hard candy. ¨ Take an over-the-counter cough medicine that contains dextromethorphan to help you get some sleep. Read and follow all instructions on the label. ¨ Raise your head at night with an extra pillow. This may help you rest if coughing keeps you awake. · Take any prescribed medicine exactly as directed. Call your doctor if you think you are having a problem with your medicine. To avoid spreading the flu · Wash your hands regularly, and keep your hands away from your face. · Stay home from school, work, and other public places until you are feeling better and your fever has been gone for at least 24 hours. The fever needs to have gone away on its own without the help of medicine. · Ask people living with you to talk to their doctors about preventing the flu. They may get antiviral medicine to keep from getting the flu from you. · To prevent the flu in the future, get a flu vaccine every fall. Encourage people living with you to get the vaccine. · Cover your mouth when you cough or sneeze. When should you call for help? Call 911 anytime you think you may need emergency care. For example, call if: 
? · You have severe trouble breathing. ?Call your doctor now or seek immediate medical care if: 
? · You have new or worse trouble breathing. ? · You seem to be getting much sicker. ? · You feel very sleepy or confused. ? · You have a new or higher fever. ? · You get a new rash. ? Watch closely for changes in your health, and be sure to contact your doctor if: 
? · You begin to get better and then get worse. ? · You are not getting better after 1 week. Where can you learn more? Go to http://michael-elaine.info/. Enter C316 in the search box to learn more about \"Influenza (Flu): Care Instructions. \" Current as of: May 12, 2017 Content Version: 11.4 © 5908-2830 [employer]. Care instructions adapted under license by [employer] (which disclaims liability or warranty for this information). If you have questions about a medical condition or this instruction, always ask your healthcare professional. [address] any warranty or liability for your use of this information. Introducing [facility]! Dear Nicole Burton: 
Thank you for requesting a Web Performance account. Our records indicate that you have previously registered for a Web Performance account but its currently inactive. Please call our [employer] Performance support line at [phone]. Additional Information If you have questions, please visit the Frequently Asked Questions section of the [employer] website at [email or web address] Remember, [employer] Performance is NOT to be used for urgent needs. For medical emergencies, dial 911. Now available from your iPhone and Android! Please provide this summary of care documentation to your next provider. Your primary care clinician is listed as Gabby Sandhu. If you have any questions after today's visit, please call [phone].

## 2018-01-31 NOTE — PROGRESS NOTES
Chief Complaint   Patient presents with    Generalized Body Aches    Fever   pt c/o body aches,fever, chills,cough and slight nausea that started Tuesday morning, he denies receiving flu shot,

## 2018-02-01 ENCOUNTER — TELEPHONE (OUTPATIENT)
Dept: ENDOCRINOLOGY | Age: 23
End: 2018-02-01

## 2018-02-01 RX ORDER — INSULIN PUMP SYRINGE, 3 ML
EACH MISCELLANEOUS
Qty: 1 KIT | Refills: 0 | Status: SHIPPED | OUTPATIENT
Start: 2018-02-01

## 2018-02-01 NOTE — PATIENT INSTRUCTIONS

## 2018-02-01 NOTE — TELEPHONE ENCOUNTER
Patient's mother, Olayinka Lugo, called to say that Mateus Metzger has the flu and has lost his meter. She wants to know if you can call in a new one to his pharmacy? He has the One Touch Ultra Test Strips. Olayinka Lugo can be reached at:  [phone].       [facility]    [phone]  New Meter

## 2018-03-16 ENCOUNTER — TELEPHONE (OUTPATIENT)
Dept: ENDOCRINOLOGY | Age: 23
End: 2018-03-16

## 2018-03-16 NOTE — TELEPHONE ENCOUNTER
Patient is calling for a  Refill for Novolog.  His pharmacy is [facility] at [address] [facility]    Patient contact:  [phone]

## 2018-03-19 RX ORDER — INSULIN ASPART 100 [IU]/ML
INJECTION, SOLUTION INTRAVENOUS; SUBCUTANEOUS
Qty: 5 VIAL | Refills: 3 | Status: SHIPPED | OUTPATIENT
Start: 2018-03-19 | End: 2019-04-25 | Stop reason: SDUPTHER

## 2018-05-04 ENCOUNTER — OFFICE VISIT (OUTPATIENT)
Dept: ENDOCRINOLOGY | Age: 23
End: 2018-05-04

## 2018-05-04 VITALS
DIASTOLIC BLOOD PRESSURE: 88 MMHG | BODY MASS INDEX: 19.73 KG/M2 | HEIGHT: 70 IN | WEIGHT: 137.8 LBS | HEART RATE: 73 BPM | SYSTOLIC BLOOD PRESSURE: 131 MMHG

## 2018-05-04 DIAGNOSIS — E78.5 HYPERLIPIDEMIA, UNSPECIFIED HYPERLIPIDEMIA TYPE: ICD-10-CM

## 2018-05-04 DIAGNOSIS — E10.9 TYPE 1 DIABETES MELLITUS WITHOUT COMPLICATION (HCC): Primary | ICD-10-CM

## 2018-05-04 LAB — HBA1C MFR BLD HPLC: 9.6 %

## 2018-05-04 NOTE — PROGRESS NOTES
CHIEF COMPLAINT: f/u for uncontrolled Type 1 diabetes     HISTORY OF PRESENT ILLNESS:   Jon Vinson is a 26 y.o. male with a PMHx as noted below who presents for f/u evaluation of type 1 diabetes. History of Type 1 Diabetes:  Patient reports that they were diagnosed with type 1 diabetes at age 13  Family history is negative for diabetes. Last evaluated, per records, by peds endo this past July, transitioning,  Using insulin pump since grade 4.     ----------------------  Interval History: On initial visit patient had no blood sugars for us to review, was advised to send me sugar log in 1 week for review but this was not done. He also missed his 2 month follow up appointment thereafter. He presents today for f/u with glucometer and findings of highly variable blood sugars, which have only been checked in the past 2 weeks suggesting that he is manually bolusing with the pump and not getting correction insulin regularly. HbA1c today is 9.6%, previously 11.9 so this is some improvement. Patient unable to tell me what his carb ratio is though manually bolusing. Current home regimen includes:    Insulin pump: Medtronic Minimed     Humalog insulin used, see settings below. HOME BLOOD SUGARS:     Highly variable sugars,     Only started checking sugars regularly on the 21st, prior to that sugars rarely checked at all. Sugars rainge from 60 - 400,      Log will be scanned into connect care.     Review of most recent diabetes-related labs:  Lab Results   Component Value Date    NAJ6AVZJ 11.9 12/21/2017    LMB8WULW 13.4 07/24/2017    OEV0DHAX 9.8 08/31/2016    CHOL 210 (H) 12/21/2017    LDLC 142 (H) 12/21/2017    GFRAA 144 12/21/2017    GFRNA 124 12/21/2017    MCACR 269.7 (H) 12/21/2017    TSH 0.932 08/31/2016    VITD3 25.8 (L) 11/05/2013     Lab Key:  [id number] = IA-2 pancreatic islet cell autoantibody  GADLT = JADE-65 autoantibody   MCACR = Urine Microalbumin  INSUL = Insulin level  CPEPL = C-peptide level      PAST MEDICAL/SURGICAL HISTORY:   Past Medical History:   Diagnosis Date    DM type 1 (diabetes mellitus, type 1) ([facility] [address]     Vitamin D deficiency      Past Surgical History:   Procedure Laterality Date    HX TYMPANOSTOMY  1999       ALLERGIES:   Allergies   Allergen Reactions    Latex Rash       MEDICATIONS ON ADMISSION:     Current Outpatient Prescriptions:     insulin aspart U-100 (NOVOLOG U-100 INSULIN ASPART) 100 unit/mL injection, Use as directed, infuse via Insulin Pump, Max Dose 100 units per day, Disp: 5 Vial, Rfl: 3    Blood-Glucose Meter (ONETOUCH ULTRA2) monitoring kit, Use to test blood sugars 4 times per day. DX Code: E10.9, Disp: 1 Kit, Rfl: 0    glucose blood VI test strips (ASCENSIA AUTODISC VI, ONE TOUCH ULTRA TEST VI) strip, Check blood sugar 4x/day, Diagnosis E10.9, Disp: 400 Strip, Rfl: 3    Lancets (ONE TOUCH DELICA) Misc, To use up to 100 units daily, Disp: 1 Package, Rfl: 11    glucagon 1 mg Kit, 1 mg by Injection route once as needed for 1 dose., Disp: 2 Kit, Rfl: 12    glucose blood VI test strips (CONTOUR NEXT STRIPS) strip, Use to check blood sugar up to 10 times daily, E10.9, Disp: 400 Strip, Rfl: 3    SOCIAL HISTORY:   Social History     Social History    Marital status: SINGLE     Spouse name: N/A    Number of children: N/A    Years of education: N/A     Occupational History    Not on file.      Social History Main Topics    Smoking status: Current Every Day Smoker     Last attempt to quit: 8/28/2017    Smokeless tobacco: Former User      Comment: VAPES    Alcohol use No    Drug use: No    Sexual activity: No     Other Topics Concern    Not on file     Social History Narrative       FAMILY HISTORY:  Family History   Problem Relation Age of Onset    Alcohol abuse Paternal Grandfather      cancer, type 2    Thyroid Disease Paternal Grandmother     Diabetes Neg Hx        REVIEW OF SYSTEMS: Complete ROS assessed and noted for that which is described above, all else are negative. Eyes: normal  ENT: normal  CVS: normal  Resp: normal  GI: normal  : normal  GYN: normal  Endocrine: normal  Integument: normal  Musculoskeletal: normal  Neuro: normal  Psych: normal      PHYSICAL EXAMINATION:    VITAL SIGNS:  Visit Vitals    /88 (BP 1 Location: Left arm, BP Patient Position: Sitting)    Pulse 73    Ht 5' 10\" (1.778 m)    Wt 137 lb 12.8 oz (62.5 kg)    BMI 19.77 kg/m2       GENERAL: NCAT, Sitting comfortably, NAD  EYES: EOMI, non-icteric, no proptosis  Ear/Nose/Throat: NCAT, no inflammation, no masses  LYMPH NODES: No LAD  CARDIOVASCULAR: S1 S2, RRR, No murmur, 2+ radial pulses  RESPIRATORY: CTA b/l, no wheeze/rales  GASTROINTESTINAL: soft, NT, ND, BS normal  MUSCULOSKELETAL: Normal ROM, no atrophy  SKIN: warm, no edema/rash/ or other skin changes  NEUROLOGIC: 5/5 power all extremities, no parasthesias, AAOx3  PSYCHIATRIC: Normal affect, Normal insight and judgement      REVIEW OF LABORATORY AND RADIOLOGY DATA:   Labs and documentation have been reviewed as described above. ASSESSMENT AND PLAN:   Shan Gowers is a 26 y.o. male with a PMHx as noted above who presents for f/u evaluation of type 1 diabetes. Type 1 Diabetes, Uncontrolled    1. Patient only started checking sugars 2 weeks before visit, so most of the time not providing glucose level to pump in order to receive correction insulin, and suggests manual bolus with meals. When asked about his carb ratio used, he does not recall which prompted us to discuss how he was taking his insulin. He admits to just taking 5-6 units with breakfast, and 7.5-8 units with lunch and dinner. On prior visit however he reported having been comfortable with carb counting though this is not the case. 2. The occurrence of low sugars are variable and do not suggest a specific pattern of hypoglycemia, and has occurred 4 times in the past 2 weeks, into the 60's.  Patient needs better meal planning, as he is not eating on a specific meal schedule. Also, the variability is related to taking unstandardized doses of insulin rather than using carb ratios. I had advised him that we could even customize standing doses for him rather than carb counting however with consideration to the high variability, carb counting would be ideal.     3. At this time, increasing insulin doses will result in more frequent lows, and decreasing doses will result in more frequent highs. In any case, he is not using the pump settings other than his basal, of which there is no role of increasing or decreasing at this time. He really needs to be re-educated on the use of his pump from the beginning and I will work with him to optimize his settings thereafter. * REFERRING TO DIABETES EDUCATION FOR PUMP EDUCATION     Plan: Type 1 Diabetes  Medications:    Insulin pump: Medtronic Minimed   Type of isulin used: Humalog   Total daily insulin used approx. 45-50 units   Settings:   Basal: (u/hr)   12 AM: 0.9    3 AM: 1.3    8 AM: 1.5   12 Noon: 1.3    Bolus:   12 AM  1:8 CR   12:30 AM  1:8 CR   5:30 AM 1.8 CR  Sensitivity:   1:30 all times    BP: Today his BP is normal  Lipids: , not on statin, advised dietary change, rechecking before next visit at lab, will treat if still high. RTC: I would like to see them back in 2 months  >25 minutes spent together with patient today of which >50% of this time was spent in counseling and coordination of care. Lucille Roth.  [address] [facility]

## 2018-05-04 NOTE — MR AVS SNAPSHOT
[address] [facility] [address] [address] [phone] Patient: Ashley Cho MRN: [id number] :1995 Visit Information Date & Time Provider Department Dept. Phone Encounter #  
 2018 11:50 AM Matthew Rouse, [address] [place] [address] [facility] End[facility]ology [phone] Upcoming Health Maintenance Date Due  
 HPV Age 9Y-34Y (3 of 1 - Male 3 Dose Series) 2008 Pneumococcal 19-64 Medium Risk (1 of 1 - PPSV23) 2016 EYE EXAM RETINAL OR DILATED Q1 8/10/2016 DTaP/Tdap/Td series (1 - Tdap) 2018 HEMOGLOBIN A1C Q6M 2018 Influenza Age 5 to Adult 2018 FOOT EXAM Q1 2018 MICROALBUMIN Q1 2018 LIPID PANEL Q1 2018 Allergies as of 2018  Review Complete On: 2018 By: Matthew Rouse MD  
  
 Severity Noted Reaction Type Reactions Latex  10/01/2011   Side Effect Rash Current Immunizations  Never Reviewed No immunizations on file. Not reviewed this visit You Were Diagnosed With   
  
 Codes Comments Type 1 diabetes mellitus without complication (HCC)    -  Primary ICD-10-CM: E10.9 ICD-9-CM: 250.01 Vitals BP Pulse Height(growth percentile) Weight(growth percentile) BMI Smoking Status 131/88 (BP 1 Location: Left arm, BP Patient Position: Sitting) 73 5' 10\" (1.778 m) 137 lb 12.8 oz (62.5 kg) 19.77 kg/m2 Current Every Day Smoker BMI and BSA Data Body Mass Index Body Surface Area  
 19.77 kg/m 2 1.76 m 2 Preferred Pharmacy Pharmacy Name Phone [facility] [address] [facility], [address] AT [address] [place] [address], Nona Osler [phone] Your Updated Medication List  
  
   
This list is accurate as of 18 12:15 PM.  Always use your most recent med list.  
  
  
  
  
 Blood-Glucose Meter monitoring kit Commonly known as:  Kristen Singleton  
 Use to test blood sugars 4 times per day. DX Code: E10.9  
  
 glucagon 1 mg Kit  
1 mg by Injection route once as needed for 1 dose. * glucose blood VI test strips strip Commonly known as:  CONTOUR NEXT TEST STRIPS Use to check blood sugar up to 10 times daily, E10.9  
  
 * glucose blood VI test strips strip Commonly known as:  ASCENSIA AUTODISC VI, ONE TOUCH ULTRA TEST VI Check blood sugar 4x/day, Diagnosis E10.9  
  
 insulin aspart U-100 100 unit/mL injection Commonly known as:  NovoLOG U-100 Insulin aspart Use as directed, infuse via Insulin Pump, Max Dose 100 units per day Lancets Misc Commonly known as: One Touch Danetta Naval To use up to 100 units daily * Notice: This list has 2 medication(s) that are the same as other medications prescribed for you. Read the directions carefully, and ask your doctor or other care provider to review them with you. We Performed the Following AMB POC HEMOGLOBIN A1C [92965 CPT(R)] REFERRAL TO DIABETIC EDUCATION [REF20 Custom] Comments:  
 Patient has insulin pump (medtronic), not using the bolus wizard, rather has been uncomfortable with carb counting and is manually bolusing based on his guess. He has highly variable blood sugars, increasing doses will cause more lows, reducing doses will cause more highs, the best approach is to re-learn how to use his insulin pump and how to carb count. Patients second visit out of the peds dept and so he is slowly working on transition to adult diabetes type 1 management. Thanks ! Referral Information Referral ID Referred By Referred To  
  
 [id number] Aj Reyna Not Available Visits Status Start Date End Date 1 New Request 5/4/18 5/4/19 If your referral has a status of pending review or denied, additional information will be sent to support the outcome of this decision. Introducing [facility]! [facility] introduces [employer] patient portal. Now you can access parts of your medical record, email your doctor's office, and request medication refills online. 1. In your internet browser, go to [email or web address] 2. Click on the First Time User? Click Here link in the Sign In box. You will see the New Member Sign Up page. 3. Enter your [employer] Access Code exactly as it appears below. You will not need to use this code after youve completed the sign-up process. If you do not sign up before the expiration date, you must request a new code. · [employer] Access Code: [email or web address] Expires: 8/2/2018 12:14 PM 
 
4. Enter the last four digits of your Social Security Number (xxxx) and Date of Birth (mm/dd/yyyy) as indicated and click Submit. You will be taken to the next sign-up page. 5. Create a [employer] ID. This will be your [employer] login ID and cannot be changed, so think of one that is secure and easy to remember. 6. Create a [employer] password. You can change your password at any time. 7. Enter your Password Reset Question and Answer. This can be used at a later time if you forget your password. 8. Enter your e-mail address. You will receive e-mail notification when new information is available in [address] 9. Click Sign Up. You can now view and download portions of your medical record. 10. Click the Download Summary menu link to download a portable copy of your medical information. If you have questions, please visit the Frequently Asked Questions section of the [employer] website. Remember, [employer] is NOT to be used for urgent needs. For medical emergencies, dial 911. Now available from your iPhone and Android! Please provide this summary of care documentation to your next provider. Your primary care clinician is listed as Yogi Casper. If you have any questions after today's visit, please call [phone].

## 2018-05-22 ENCOUNTER — TELEPHONE (OUTPATIENT)
Dept: DIABETES SERVICES | Age: 23
End: 2018-05-22

## 2018-11-13 ENCOUNTER — OFFICE VISIT (OUTPATIENT)
Dept: ENDOCRINOLOGY | Age: 23
End: 2018-11-13

## 2018-11-13 VITALS
WEIGHT: 139.3 LBS | SYSTOLIC BLOOD PRESSURE: 124 MMHG | HEART RATE: 71 BPM | HEIGHT: 70 IN | DIASTOLIC BLOOD PRESSURE: 73 MMHG | BODY MASS INDEX: 19.94 KG/M2

## 2018-11-13 DIAGNOSIS — E78.5 HYPERLIPIDEMIA, UNSPECIFIED HYPERLIPIDEMIA TYPE: ICD-10-CM

## 2018-11-13 DIAGNOSIS — E10.9 TYPE 1 DIABETES MELLITUS WITHOUT COMPLICATION (HCC): Primary | ICD-10-CM

## 2018-11-13 NOTE — PROGRESS NOTES
CHIEF COMPLAINT: f/u for uncontrolled Type 1 diabetes     HISTORY OF PRESENT ILLNESS:   Peterson Ortega is a 26 y.o. male with a PMHx as noted below who presents for f/u evaluation of type 1 diabetes. History of Type 1 Diabetes:  Patient reports that they were diagnosed with type 1 diabetes at age 13  Family history is negative for diabetes. Last evaluated, per records, by peds endo this past July, transitioning,  Using insulin pump since grade 4.     ----------------------  Interval History: On initial visit patient had no blood sugars for us to review, was advised to send me sugar log in 1 week for review but this was not done. He also missed his 2 month follow up appointment thereafter. He presentlater for f/u with glucometer and findings of highly variable blood sugars, which have only been checked 2 weeks prior suggesting that he is manually bolusing with the pump and not getting correction insulin regularly. A1c today is 9.0% and notably in the past month there are only 25 readings. Not checked regularly. I had sent him for diabetes education but he had not presented for that evaluation. Current home regimen includes:    Insulin pump: Medtronic Minimed     Humalog insulin used, see settings below. HOME BLOOD SUGARS:     Among the 25 readings checked in the past month, sugars are mostly low from 7 AM - 8 PM, then high overnight, though reliability of patterns is low due to the low frequency of checked sugars.       Reports 7-9 units with meals on average,     Ranging 50's during the day, 400's around bedtime, no overnight sugars    Review of most recent diabetes-related labs:  Lab Results   Component Value Date    IBO6ZJDN 9.6 05/04/2018    VDU8TBQH 11.9 12/21/2017    PMM4YQUP 13.4 07/24/2017    CHOL 210 (H) 12/21/2017    LDLC 142 (H) 12/21/2017    GFRAA 144 12/21/2017    GFRNA 124 12/21/2017    MCACR 269.7 (H) 12/21/2017    TSH 0.932 08/31/2016    VITD3 25.8 (L) 11/05/2013     Lab Key:  [id number] = IA-2 pancreatic islet cell autoantibody  GADLT = JADE-65 autoantibody   MCACR = Urine Microalbumin  INSUL = Insulin level  CPEPL = C-peptide level      PAST MEDICAL/SURGICAL HISTORY:   Past Medical History:   Diagnosis Date    DM [facility]     Vitamin D deficiency      Past Surgical History:   Procedure Laterality Date    HX TYMPANOSTOMY         ALLERGIES:   Allergies   Allergen Reactions    Latex Rash       MEDICATIONS ON ADMISSION:     Current Outpatient Medications:     insulin aspart U-100 (NOVOLOG U-100 INSULIN ASPART) 100 unit/mL injection, Use as directed, infuse via Insulin Pump, Max Dose 100 units per day, Disp: 5 Vial, Rfl: 3    Blood-Glucose Meter (ONETOUCH ULTRA2) monitoring kit, Use to test blood sugars 4 times per day.  DX Code: E10.9, Disp: 1 Kit, Rfl: 0    glucose blood VI test strips (ASCENSIA AUTODISC VI, ONE TOUCH ULTRA TEST VI) strip, Check blood sugar 4x/day, Diagnosis E10.9, Disp: 400 Strip, Rfl: 3    Lancets (ONE TOUCH DELICA) Misc, To use up to 100 units daily, Disp: 1 Package, Rfl: 11    glucagon 1 mg Kit, 1 mg by Injection route once as needed for 1 dose., Disp: 2 Kit, Rfl: 12    glucose blood VI test strips (CONTOUR NEXT STRIPS) strip, Use to check blood sugar up to 10 times daily, E10.9, Disp: 400 Strip, Rfl: 3    SOCIAL HISTORY:   Social History     Socioeconomic History    Marital status: SINGLE     Spouse name: Not on file    Number of children: Not on file    Years of education: Not on file    Highest education level: Not on file   Social Needs    Financial resource strain: Not on file    Food insecurity - worry: Not on file    Food insecurity - inability: Not on file   [employer] needs - medical: Not on file   [employer] needs - non-medical: Not on file   Occupational History    Not on file   Tobacco Use    Smoking status: Former Smoker     Last attempt to quit: 2017     Years since quittin.2    Smokeless tobacco: Former User    Tobacco comment: VAPES   Substance and Sexual Activity    Alcohol use: No    Drug use: No    Sexual activity: No     Partners: Female   Other Topics Concern    Not on file   Social History Narrative    Not on file       FAMILY HISTORY:  Family History   Problem Relation Age of Onset    Alcohol abuse Paternal Grandfather         cancer, type 2    Thyroid Disease Paternal Grandmother     Diabetes Neg Hx        REVIEW OF SYSTEMS: Complete ROS assessed and noted for that which is described above, all else are negative. Eyes: normal  ENT: normal  CVS: normal  Resp: normal  GI: normal  : normal  GYN: normal  Endocrine: normal  Integument: normal  Musculoskeletal: normal  Neuro: normal  Psych: normal      PHYSICAL EXAMINATION:    VITAL SIGNS:  Visit Vitals  /73 (BP 1 Location: Left arm, BP Patient Position: Sitting)   Pulse 71   Ht 5' 10\" (1.778 m)   Wt 139 lb 4.8 oz (63.2 kg)   BMI 19.99 kg/m²       GENERAL: NCAT, Sitting comfortably, NAD  EYES: EOMI, non-icteric, no proptosis  Ear/Nose/Throat: NCAT, no inflammation, no masses  LYMPH NODES: No LAD  CARDIOVASCULAR: S1 S2, RRR, No murmur, 2+ radial pulses  RESPIRATORY: CTA b/l, no wheeze/rales  GASTROINTESTINAL: soft, NT, ND, BS normal  MUSCULOSKELETAL: Normal ROM, no atrophy  SKIN: warm, no edema/rash/ or other skin changes  NEUROLOGIC: 5/5 power all extremities, no parasthesias, AAOx3  PSYCHIATRIC: Normal affect, Normal insight and judgement      REVIEW OF LABORATORY AND RADIOLOGY DATA:   Labs and documentation have been reviewed as described above. ASSESSMENT AND PLAN:   Henrique Persaud is a 26 y.o. male with a PMHx as noted above who presents for f/u evaluation of type 1 diabetes.      Type 1 Diabetes, Uncontrolled    Did not go for diabetes education  Checking sugars on ave of once per day  A1c is 9.0% which is too far above our safe goal  Having frequent daytime lows with bedtime highs, possibly rebound from treating  Not enough data to know for sure,   Patient would benefit from a sensor, recommended a dexcom, brochure provided, advised him to call. Encouraged to use bolus wizard in order to receive correction insulin,   Encouraged more frequent blood sugar checks, dexcom G6 would help with this and give us more information about his sugars. Will reduce his basal during the day as his sugars have been persistently low, likely due to increase daytime activity. Plan: Type 1 Diabetes  Medications:    Insulin pump: Medtronic Minimed   Type of isulin used: Humalog   Total daily insulin used approx. 45-50 units   Settings:   Basal: (u/hr)   12 AM: 0.9    3 AM: 1.3    8 AM: 1.5  ---> reduce to 1.2 (lows all through the night) eats big meal in evenings,   12 Noon: 1.3    Bolus:   12 AM  1:8 CR   12:30 AM  1:8 CR   5:30 AM 1.8 CR  Sensitivity:   1:30 all times    BP: Today his BP is normal  Lipids: , not on statin    Labs: DM panel ordered as prelab for next visit,    RTC: I would like to see them back in 3 months,  Advised him to sign up for [employer] and communicate with me his sugars for further adjustment in the near future,    Romeo Payne.  [id number] [facility]

## 2018-11-15 LAB — HBA1C MFR BLD HPLC: 9 %

## 2018-11-29 ENCOUNTER — TELEPHONE (OUTPATIENT)
Dept: ENDOCRINOLOGY | Age: 23
End: 2018-11-29

## 2018-11-29 NOTE — TELEPHONE ENCOUNTER
----- Message from Radha Garcia sent at 11/29/2018 12:08 PM EST -----  Regarding: Dr Chapito Teague refill  The pt called because a script is needed for a \"dexcom meter\" and that should be sent to the [facility] pharmacy on file.         Best contact number is [phone]

## 2018-11-30 NOTE — TELEPHONE ENCOUNTER
I called mr. Nava back and told him that he needed to contact [employer] and they would send us the paper work we needed to fill out. He will do this.   Grecia Donohue

## 2018-12-26 ENCOUNTER — OFFICE VISIT (OUTPATIENT)
Dept: URGENT CARE | Age: 23
End: 2018-12-26

## 2018-12-26 VITALS
HEIGHT: 71 IN | OXYGEN SATURATION: 97 % | BODY MASS INDEX: 18.2 KG/M2 | TEMPERATURE: 98.1 F | SYSTOLIC BLOOD PRESSURE: 131 MMHG | WEIGHT: 130 LBS | RESPIRATION RATE: 16 BRPM | HEART RATE: 74 BPM | DIASTOLIC BLOOD PRESSURE: 85 MMHG

## 2018-12-26 DIAGNOSIS — S69.92XA WRIST INJURY, LEFT, INITIAL ENCOUNTER: Primary | ICD-10-CM

## 2018-12-27 NOTE — PATIENT INSTRUCTIONS
Ice- ROM exercise  Motrin       Wrist Sprain: Rehab Exercises  Your Care Instructions  Here are some examples of typical rehabilitation exercises for your condition. Start each exercise slowly. Ease off the exercise if you start to have pain. Your doctor or your physical or occupational therapist will tell you when you can start these exercises and which ones will work best for you. How to do the exercises  Resisted wrist extension    1. Sit leaning forward with your legs slightly spread. Then place your forearm on your thigh with your affected hand and wrist in front of your knee. 2. Grasp one end of an exercise band with your palm down. Step on the other end.  3. Slowly bend your wrist upward for a count of 2. Then lower your wrist slowly to a count of 5.  4. Repeat 8 to 12 times. Resisted wrist flexion    1. Sit leaning forward with your legs slightly spread. Then place your forearm on your thigh with your affected hand and wrist in front of your knee. 2. Grasp one end of an exercise band with your palm up. Step on the other end.  3. Slowly bend your wrist upward for a count of 2. Then lower your wrist slowly to a count of 5.  4. Repeat 8 to 12 times. Resisted radial deviation    1. Sit leaning forward with your legs slightly spread. Then place your forearm on your thigh with your affected hand and wrist in front of your knee. 2. Grasp one end of an exercise band with your hand facing toward your other thigh. Step on the other end.  3. Slowly bend your wrist upward for a count of 2. Then lower your wrist slowly to a count of 5.  4. Repeat 8 to 12 times. Resisted ulnar deviation    1. Sit leaning forward with your legs slightly spread. Then place your forearm on your thigh with your affected hand and wrist by the inside of your knee. 2. Grasp one end of an exercise band with your palm down. Step on the other end with the foot opposite the hand holding the band.   3. Slowly bend your wrist outward and toward your knee for a count of 2. Then slowly move your wrist back to the starting position to a count of 5.  4. Repeat 8 to 12 times. Resisted forearm pronation    1. Sit leaning forward with your legs slightly spread. Then place your forearm on your thigh with your affected hand and wrist in front of your knee. 2. Grasp one end of an exercise band with your palm up. Step on the other end. 3. Keeping your wrist straight, roll your palm inward toward your thigh for a count of 2. Then slowly move your wrist back to the starting position to a count of 5.  4. Repeat 8 to 12 times. Resisted supination    1. Sit leaning forward with your legs slightly spread. Then place your forearm on your thigh with your affected hand and wrist in front of your knee. 2. Grasp one end of an exercise band with your palm down. Step on the other end. 3. Keeping your wrist straight, roll your palm outward and away from your thigh for a count of 2. Then slowly move your wrist back to the starting position to a count of 5.  4. Repeat 8 to 12 times. Follow-up care is a key part of your treatment and safety. Be sure to make and go to all appointments, and call your doctor if you are having problems. It's also a good idea to know your test results and keep a list of the medicines you take. Where can you learn more? Go to http://michael-elaine.info/. Enter S110 in the search box to learn more about \"Wrist Sprain: Rehab Exercises. \"  Current as of: November 29, 2017  Content Version: 11.8  © 4828-7763 [employer]wise, [employer]. Care instructions adapted under license by [employer] (which disclaims liability or warranty for this information). If you have questions about a medical condition or this instruction, always ask your healthcare professional. [address] any warranty or liability for your use of this information.

## 2019-04-25 RX ORDER — INSULIN ASPART 100 [IU]/ML
INJECTION, SOLUTION INTRAVENOUS; SUBCUTANEOUS
Qty: 9 VIAL | Refills: 0 | Status: SHIPPED | OUTPATIENT
Start: 2019-04-25 | End: 2019-09-27 | Stop reason: SDUPTHER

## 2019-06-29 LAB
ALBUMIN SERPL-MCNC: 4.7 G/DL (ref 3.5–5.5)
ALBUMIN/CREAT UR: 41.5 MG/G CREAT (ref 0–30)
ALBUMIN/GLOB SERPL: 2 {RATIO} (ref 1.2–2.2)
ALP SERPL-CCNC: 79 IU/L (ref 39–117)
ALT SERPL-CCNC: 8 IU/L (ref 0–44)
AST SERPL-CCNC: 18 IU/L (ref 0–40)
BILIRUB SERPL-MCNC: 1 MG/DL (ref 0–1.2)
BUN SERPL-MCNC: 15 MG/DL (ref 6–20)
BUN/CREAT SERPL: 17 (ref 9–20)
CALCIUM SERPL-MCNC: 9.5 MG/DL (ref 8.7–10.2)
CHLORIDE SERPL-SCNC: 103 MMOL/L (ref 96–106)
CHOLEST SERPL-MCNC: 137 MG/DL (ref 100–199)
CO2 SERPL-SCNC: 22 MMOL/L (ref 20–29)
CREAT SERPL-MCNC: 0.87 MG/DL (ref 0.76–1.27)
CREAT UR-MCNC: 96.2 MG/DL
EST. AVERAGE GLUCOSE BLD GHB EST-MCNC: 194 MG/DL
GLOBULIN SER CALC-MCNC: 2.3 G/DL (ref 1.5–4.5)
GLUCOSE SERPL-MCNC: 192 MG/DL (ref 65–99)
HBA1C MFR BLD: 8.4 % (ref 4.8–5.6)
HDLC SERPL-MCNC: 46 MG/DL
INTERPRETATION, 910389: NORMAL
LDLC SERPL CALC-MCNC: 84 MG/DL (ref 0–99)
Lab: NORMAL
MICROALBUMIN UR-MCNC: 39.9 UG/ML
POTASSIUM SERPL-SCNC: 4.1 MMOL/L (ref 3.5–5.2)
PROT SERPL-MCNC: 7 G/DL (ref 6–8.5)
SODIUM SERPL-SCNC: 142 MMOL/L (ref 134–144)
TRIGL SERPL-MCNC: 37 MG/DL (ref 0–149)
VLDLC SERPL CALC-MCNC: 7 MG/DL (ref 5–40)

## 2019-07-01 ENCOUNTER — OFFICE VISIT (OUTPATIENT)
Dept: ENDOCRINOLOGY | Age: 24
End: 2019-07-01

## 2019-07-01 VITALS
BODY MASS INDEX: 18.97 KG/M2 | WEIGHT: 135.5 LBS | HEART RATE: 91 BPM | HEIGHT: 71 IN | RESPIRATION RATE: 16 BRPM | DIASTOLIC BLOOD PRESSURE: 87 MMHG | SYSTOLIC BLOOD PRESSURE: 125 MMHG | TEMPERATURE: 97.7 F | OXYGEN SATURATION: 97 %

## 2019-07-01 DIAGNOSIS — E10.9 TYPE 1 DIABETES MELLITUS WITHOUT COMPLICATION (HCC): Primary | ICD-10-CM

## 2019-07-01 DIAGNOSIS — E78.5 HYPERLIPIDEMIA, UNSPECIFIED HYPERLIPIDEMIA TYPE: ICD-10-CM

## 2019-07-01 NOTE — PROGRESS NOTES
Calista Le is a 27 y.o. male     Chief Complaint   Patient presents with    Diabetes     3 month follow up       Visit Vitals  /87 (BP 1 Location: Left arm, BP Patient Position: Sitting)   Pulse 91   Temp 97.7 °F (36.5 °C) (Oral)   Resp 16   Ht 5' 11\" (1.803 m)   Wt 135 lb 8 oz (61.5 kg)   SpO2 97%   BMI 18.90 kg/m²       Health Maintenance Due   Topic Date Due    Pneumococcal 0-64 years (1 of 1 - PPSV23) 04/06/2003    EYE EXAM RETINAL OR DILATED  08/10/2017    DTaP/Tdap/Td series (1 - Tdap) 04/06/2018    FOOT EXAM Q1  12/21/2018       1. Have you been to the ER, urgent care clinic since your last visit? Hospitalized since your last visit? No    2. Have you seen or consulted any other health care providers outside of the [address] [place] [address] since your last visit? Include any pap smears or colon screening.  No

## 2019-07-01 NOTE — PROGRESS NOTES
CHIEF COMPLAINT: f/u for uncontrolled Type 1 diabetes     HISTORY OF PRESENT ILLNESS:   Juan Miguel Graham is a 27 y.o. male with a PMHx as noted below who presents for f/u evaluation of type 1 diabetes. History of Type 1 Diabetes:  Patient reports that they were diagnosed with type 1 diabetes at age 13  Family history is negative for diabetes. Last evaluated, per records, by peds endo this past July, transitioning,  Using insulin pump since grade 4.     ----------------------  Interval History: On the prior visit his A1c was 9.0%, and he was checking his sugar infrequently. He presents today for f/u, A1c is 8.4%. He did get the dexcom G6 but is working on the supplies issue. Current home regimen includes:    Insulin pump: Medtronic Minimed     Humalog insulin used, see settings below.      HOME BLOOD SUGARS:     Lately checks sugars about 1 time per day     Not used dexcom in 3 weeks, does not have it with him     Insulin pump battery is low and unable to download,     AM sugars reported: \"pretty normal\"  Mid-day  \"average 180-200\"     Review of most recent diabetes-related labs:  Lab Results   Component Value Date    HBA1C 8.4 (H) 06/28/2019    GOB5ZRAO 9.0 11/13/2018    HKF0VLUF 9.6 05/04/2018    JPW7YREC 11.9 12/21/2017    CHOL 137 06/28/2019    LDLC 84 06/28/2019    GFRAA 142 06/28/2019    GFRNA 123 06/28/2019    MCACR 41.5 (H) 06/28/2019    TSH 0.932 08/31/2016    VITD3 25.8 (L) 11/05/2013     Lab Key:  [id number] = IA-2 pancreatic islet cell autoantibody  GADLT = JADE-65 autoantibody   MCACR = Urine Microalbumin  INSUL = Insulin level  CPEPL = C-peptide level      PAST MEDICAL/SURGICAL HISTORY:   Past Medical History:   Diagnosis Date    DM type 1 [facility]di[facility] mellitus, type 1) ([facility] [address]     Vitamin D deficiency      Past Surgical History:   Procedure Laterality Date    HX TYMPANOSTOMY  1999       ALLERGIES:   Allergies   Allergen Reactions    Latex Rash       MEDICATIONS ON ADMISSION:     Current Outpatient Medications:     insulin aspart U-100 (NOVOLOG U-100 INSULIN ASPART) 100 unit/mL injection, Use as directed, infuse via Insulin Pump, Max Dose 100 units per day, Disp: 9 Vial, Rfl: 0    Blood-Glucose Meter (ONETOUCH ULTRA2) monitoring kit, Use to test blood sugars 4 times per day.  DX Code: E10.9, Disp: 1 Kit, Rfl: 0    glucose blood VI test strips (ASCENSIA AUTODISC VI, ONE TOUCH ULTRA TEST VI) strip, Check blood sugar 4x/day, Diagnosis E10.9, Disp: 400 Strip, Rfl: 3    Lancets (ONE TOUCH DELICA) Misc, To use up to 100 units daily, Disp: 1 Package, Rfl: 11    SOCIAL HISTORY:   Social History     Socioeconomic History    Marital status: SINGLE     Spouse name: Not on file    Number of children: Not on file    Years of education: Not on file    Highest education level: Not on file   Occupational History    Not on file   Social Needs    Financial resource strain: Not on file    Food insecurity:     Worry: Not on file     Inability: Not on file    Transportation needs:     Medical: Not on file     Non-medical: Not on file   Tobacco Use    Smoking status: Former Smoker     Last attempt to quit: 2017     Years since quittin.8    Smokeless tobacco: Former User    Tobacco comment: VAPES   Substance and Sexual Activity    Alcohol use: No    Drug use: No    Sexual activity: Never     Partners: Female   Lifestyle    Physical activity:     Days per week: Not on file     Minutes per session: Not on file    Stress: Not on file   Relationships    Social connections:     Talks on phone: Not on file     Gets together: Not on file     Attends Alevism service: Not on file     Active member of club or organization: Not on file     Attends meetings of clubs or organizations: Not on file     Relationship status: Not on file    Intimate partner violence:     Fear of current or ex partner: Not on file     Emotionally abused: Not on file     Physically abused: Not on file     Forced sexual activity: Not on file   Other Topics Concern    Not on file   Social History Narrative    Not on file       FAMILY HISTORY:  Family History   Problem Relation Age of Onset    Alcohol abuse Paternal Grandfather         cancer, type 2    Thyroid Disease Paternal Grandmother     Diabetes Neg Hx        REVIEW OF SYSTEMS: Complete ROS assessed and noted for that which is described above, all else are negative. Eyes: normal  ENT: normal  CVS: normal  Resp: normal  GI: normal  : normal  GYN: normal  Endocrine: normal  Integument: normal  Musculoskeletal: normal  Neuro: normal  Psych: normal    PHYSICAL EXAMINATION:    VITAL SIGNS:  Visit Vitals  /87 (BP 1 Location: Left arm, BP Patient Position: Sitting)   Pulse 91   Temp 97.7 °F (36.5 °C) (Oral)   Resp 16   Ht 5' 11\" (1.803 m)   Wt 135 lb 8 oz (61.5 kg)   SpO2 97%   BMI 18.90 kg/m²       GENERAL: NCAT, Sitting comfortably, NAD  EYES: EOMI, non-icteric, no proptosis  Ear/Nose/Throat: NCAT, no inflammation, no masses  LYMPH NODES: No LAD  CARDIOVASCULAR: S1 S2, RRR, No murmur, 2+ radial pulses  RESPIRATORY: CTA b/l, no wheeze/rales  GASTROINTESTINAL: soft, NT, ND, BS normal  MUSCULOSKELETAL: Normal ROM, no atrophy  SKIN: warm, no edema/rash/ or other skin changes  NEUROLOGIC: 5/5 power all extremities, no parasthesias, AAOx3  PSYCHIATRIC: Normal affect, Normal insight and judgement    Diabetic foot exam:     Left Foot:   Visual Exam: normal    Pulse DP: 2+ (normal)   Filament test: normal sensation    Vibratory sensation: Vibratory sensation: normal       Right Foot:   Visual Exam: normal    Pulse DP: 2+ (normal)   Filament test: normal sensation    Vibratory sensation: Vibratory sensation: normal    REVIEW OF LABORATORY AND RADIOLOGY DATA:   Labs and documentation have been reviewed as described above. ASSESSMENT AND PLAN:   Bony Anaya is a 27 y.o. male with a PMHx as noted above who presents for f/u evaluation of type 1 diabetes.      Type 1 Diabetes, Uncontrolled    Plan: Type 1 Diabetes  Medications:    Insulin pump: Medtronic Minimed   Type of isulin used: Humalog   Total daily insulin used approx. 45-50 units   Settings:   Basal: (u/hr)   12 AM: 0.9    3 AM: 1.3    8 AM: 1.2    12 Noon: 1.3    Bolus:   12 AM  1:8 CR   12:30 AM  1:10 CR   5:30 AM 1.8 CR   Sensitivity:   1:30 all times    BP: Today his BP is normal  Lipids: reviewed, much better than before, not on statin    Labs: Completed today     RTC: I would like to see them back in 3 months,  Recommended he send me a [employer] message (must sign up for [employer] first) to send me a share code after wearing his dexcom sensor for a few days or so, as soon as he gets his supplies. Joe Colon.  [address] [facility]

## 2019-09-27 RX ORDER — INSULIN ASPART 100 [IU]/ML
INJECTION, SOLUTION INTRAVENOUS; SUBCUTANEOUS
Qty: 90 ML | Refills: 4 | Status: ON HOLD | OUTPATIENT
Start: 2019-09-27 | End: 2021-10-23 | Stop reason: SDUPTHER

## 2020-03-19 ENCOUNTER — TELEPHONE (OUTPATIENT)
Dept: ENDOCRINOLOGY | Age: 25
End: 2020-03-19

## 2021-02-06 ENCOUNTER — OFFICE VISIT (OUTPATIENT)
Dept: PRIMARY CARE CLINIC | Age: 26
End: 2021-02-06

## 2021-02-06 VITALS
RESPIRATION RATE: 16 BRPM | OXYGEN SATURATION: 98 % | SYSTOLIC BLOOD PRESSURE: 155 MMHG | DIASTOLIC BLOOD PRESSURE: 83 MMHG | TEMPERATURE: 96.2 F | WEIGHT: 130 LBS | BODY MASS INDEX: 18.2 KG/M2 | HEART RATE: 92 BPM | HEIGHT: 71 IN

## 2021-02-06 DIAGNOSIS — K13.0 RASH ON LIPS: Primary | ICD-10-CM

## 2021-02-06 PROCEDURE — 99213 OFFICE O/P EST LOW 20 MIN: CPT | Performed by: INTERNAL MEDICINE

## 2021-02-06 RX ORDER — MUPIROCIN CALCIUM 20 MG/G
CREAM TOPICAL 2 TIMES DAILY
Qty: 15 G | Refills: 0 | Status: SHIPPED | OUTPATIENT
Start: 2021-02-06 | End: 2021-07-02

## 2021-02-06 NOTE — PROGRESS NOTES
RM 4    Chief Complaint   Patient presents with    Rash     dry irritated skin under lower lip x 1 day       Visit Vitals  BP (!) 155/83 (BP 1 Location: Right upper arm, BP Patient Position: Sitting)   Pulse 92   Temp (!) 96.2 °F (35.7 °C) (Skin)   Resp 16   Ht 5' 11\" (1.803 m)   Wt 130 lb (59 kg)   SpO2 98%   BMI 18.13 kg/m²

## 2021-02-06 NOTE — PROGRESS NOTES
HISTORY OF PRESENT ILLNESS  Vnih Lawson is a 23 y.o. male. Patient with a history of DM type 1. Reports that he began to notice that he had a rash/ulcer like area to the lower lip. Reports that yesterday it began to discharge with yellow to green pus. Was tender. No itchy or elsewhere. Reports that it was red and minimal heat. Reports that he may have drank after someone  reviewed BP. Reports that he had taken an energy drink prior to coming in. Will see endocrine in the next few weeks. BS have been 180's lately. No chills, HA, blurred vision. Visit Vitals  BP (!) 155/83 (BP 1 Location: Right upper arm, BP Patient Position: Sitting)   Pulse 92   Temp (!) 96.2 °F (35.7 °C) (Skin)   Resp 16   Ht 5' 11\" (1.803 m)   Wt 130 lb (59 kg)   SpO2 98%   BMI 18.13 kg/m²     Past Medical History:   Diagnosis Date    DM type [facility]diabetes mellitus[facility] type 1) ([facility])     Vitamin D deficiency      Outpatient Encounter Medications as of 2/6/2021   Medication Sig Dispense Refill    mupirocin calcium (BACTROBAN) 2 % topical cream Apply  to affected area two (2) times a day. 15 g 0    insulin aspart U-100 (NOVOLOG U-100 INSULIN ASPART) 100 unit/mL injection USE AS DIRECTED INFUSE VIA INSULIN PUMP. MAX DOSE 100 UNITS PER DAY 90 mL 4    Blood-Glucose Meter (ONETOUCH ULTRA2) monitoring kit Use to test blood sugars 4 times per day. DX Code: E10.9 1 Kit 0    glucose blood VI test strips (ASCENSIA AUTODISC VI, ONE TOUCH ULTRA TEST VI) strip Check blood sugar 4x/day, Diagnosis E10.9 400 Strip 3    Lancets (ONE TOUCH DELICA) Misc To use up to 100 units daily 1 Package 11     No facility-administered encounter medications on file as of 2/6/2021. HPI    Review of Systems   Constitutional: Negative for fever. Eyes: Negative. Respiratory: Negative for cough. Cardiovascular: Negative for chest pain. Gastrointestinal: Negative. Skin: Positive for rash. Neurological: Negative.         Physical Exam  Vitals signs and nursing note reviewed. Constitutional:       General: He is not in acute distress. HENT:      Head: Normocephalic. Cardiovascular:      Rate and Rhythm: Normal rate and regular rhythm. Pulmonary:      Effort: Pulmonary effort is normal.      Breath sounds: Normal breath sounds. Skin:     General: Skin is warm. Findings: No lesion. Comments: Dime size rash, dry skin and no discharge. Slightly erythematous    Neurological:      Mental Status: He is alert. ASSESSMENT and PLAN  Diagnoses and all orders for this visit:    1. Rash on lips  -     mupirocin calcium (BACTROBAN) 2 % topical cream; Apply  to affected area two (2) times a day.           reviewed medications and side effects in detail

## 2021-07-02 ENCOUNTER — OFFICE VISIT (OUTPATIENT)
Dept: URGENT CARE | Age: 26
End: 2021-07-02

## 2021-07-02 VITALS
RESPIRATION RATE: 16 BRPM | BODY MASS INDEX: 19.88 KG/M2 | HEIGHT: 71 IN | WEIGHT: 142 LBS | HEART RATE: 95 BPM | TEMPERATURE: 98.5 F | DIASTOLIC BLOOD PRESSURE: 97 MMHG | SYSTOLIC BLOOD PRESSURE: 154 MMHG

## 2021-07-02 DIAGNOSIS — S61.209A AVULSION OF SKIN OF FINGER WITHOUT COMPLICATION, INITIAL ENCOUNTER: Primary | ICD-10-CM

## 2021-07-02 PROCEDURE — 99213 OFFICE O/P EST LOW 20 MIN: CPT | Performed by: FAMILY MEDICINE

## 2021-07-04 NOTE — PROGRESS NOTES
Laceration  This is a new problem. The current episode started less than 1 hour ago. The problem has not changed since onset. Associated symptoms comments: When chopping vegetables - his cut small piece of tip of his thumb -still can't stop bleeding   He has ap(plied pressure on it . Treatments tried: compresion  The treatment provided no relief. Past Medical History:   Diagnosis Date    DM type 1 (diabetes mellitus, type 1) ([address]     Vitamin D deficiency         Past Surgical History:   Procedure Laterality Date    HX TYMPANOSTOMY  1999         Family History   Problem Relation Age of Onset    Alcohol abuse Paternal Grandfather         cancer, type 2    Thyroid Disease Paternal Grandmother     Diabetes Neg Hx         Social History     Socioeconomic History    Marital status: SINGLE     Spouse name: Not on file    Number of children: Not on file    Years of education: Not on file    Highest education level: Not on file   Occupational History    Not on file   Tobacco Use    Smoking status: Former Smoker     Quit date: 8/28/2017     Years since quitting: 3.8    Smokeless tobacco: Former User    Tobacco comment: VAPES   Substance and Sexual Activity    Alcohol use: No    Drug use: No    Sexual activity: Never     Partners: Female   Other Topics Concern    Not on file   Social History Narrative    Not on file     Social Determinants of Health     Financial Resource Strain:     Difficulty of Paying Living Expenses:    Food Insecurity:     Worried About Running Out of Food in the Last Year:     Ran Out of Food in the Last Year:    Transportation Needs:     Lack of Transportation (Medical):      Lack of Transportation (Non-Medical):    Physical Activity:     Days of Exercise per Week:     Minutes of Exercise per Session:    Stress:     Feeling of Stress :    Social Connections:     Frequency of Communication with Friends and Family:     Frequency of Social Gatherings with Friends and Family:     Attends [employer] Services:     Active Member of Clubs or Organizations:     Attends Club or Organization Meetings:     Marital Status:    Intimate Partner Violence:     Fear of Current or Ex-Partner:     Emotionally Abused:     Physically Abused:     Sexually Abused: ALLERGIES: Latex    Review of Systems   Skin: Positive for wound (left thumb tip). Vitals:    07/02/21 1841   BP: (!) 154/97   Pulse: 95   Resp: 16   Temp: 98.5 °F (36.9 °C)   Weight: 142 lb (64.4 kg)   Height: 5' 11\" (1.803 m)       Physical Exam  Vitals and nursing note reviewed. Musculoskeletal:      Left hand: Tenderness present. No swelling or lacerations (avulsion of smnaall piece of skin on tip of thumb- superficial ). Normal range of motion. Normal strength. Normal sensation. MDM    Procedures        ICD-10-CM ICD-9-CM    1. Avulsion of skin of finger without complication, initial encounter  S61.209A 883.0     from tip of left thumb       Gelfoam- organic gelatin applied to stop bleeding   Advised to keep it on and follow for wound check if not better/ worsen     No orders of the defined types were placed in this encounter. No results found for any visits on 07/02/21. The patients condition was discussed with the patient and they understand. The patient is to follow up with primary care doctor. If signs and symptoms become worse the pt is to go to the ER. The patient is to take medications as prescribed.

## 2021-10-21 ENCOUNTER — APPOINTMENT (OUTPATIENT)
Dept: GENERAL RADIOLOGY | Age: 26
DRG: 638 | End: 2021-10-21
Attending: STUDENT IN AN ORGANIZED HEALTH CARE EDUCATION/TRAINING PROGRAM
Payer: COMMERCIAL

## 2021-10-21 ENCOUNTER — HOSPITAL ENCOUNTER (INPATIENT)
Age: 26
LOS: 2 days | Discharge: HOME | DRG: 638 | End: 2021-10-23
Attending: EMERGENCY MEDICINE | Admitting: INTERNAL MEDICINE
Payer: COMMERCIAL

## 2021-10-21 DIAGNOSIS — E10.10 DIABETIC KETOACIDOSIS WITHOUT COMA ASSOCIATED WITH TYPE 1 DIABETES MELLITUS (HCC): ICD-10-CM

## 2021-10-21 DIAGNOSIS — E10.10 TYPE 1 DIABETES MELLITUS WITH KETOACIDOSIS WITHOUT COMA (HCC): Primary | ICD-10-CM

## 2021-10-21 PROBLEM — E11.10 DKA (DIABETIC KETOACIDOSIS) (HCC): Status: ACTIVE | Noted: 2021-10-21

## 2021-10-21 LAB
ADMINISTERED INITIALS, ADMINIT: NORMAL
ALBUMIN SERPL-MCNC: 4.9 G/DL (ref 3.5–5)
ALBUMIN/GLOB SERPL: 1.3 {RATIO} (ref 1.1–2.2)
ALP SERPL-CCNC: 102 U/L (ref 45–117)
ALT SERPL-CCNC: 14 U/L (ref 12–78)
ANION GAP SERPL CALC-SCNC: 22 MMOL/L (ref 5–15)
ANION GAP SERPL CALC-SCNC: 24 MMOL/L (ref 5–15)
AST SERPL-CCNC: 24 U/L (ref 15–37)
BASOPHILS # BLD: 0.1 K/UL (ref 0–0.1)
BASOPHILS NFR BLD: 1 % (ref 0–1)
BILIRUB SERPL-MCNC: 1 MG/DL (ref 0.2–1)
BUN SERPL-MCNC: 15 MG/DL (ref 6–20)
BUN SERPL-MCNC: 15 MG/DL (ref 6–20)
BUN/CREAT SERPL: 12 (ref 12–20)
BUN/CREAT SERPL: 15 (ref 12–20)
CALCIUM SERPL-MCNC: 10 MG/DL (ref 8.5–10.1)
CALCIUM SERPL-MCNC: 9 MG/DL (ref 8.5–10.1)
CHLORIDE SERPL-SCNC: 92 MMOL/L (ref 97–108)
CHLORIDE SERPL-SCNC: 96 MMOL/L (ref 97–108)
CK SERPL-CCNC: 96 U/L (ref 39–308)
CO2 SERPL-SCNC: 10 MMOL/L (ref 21–32)
CO2 SERPL-SCNC: 14 MMOL/L (ref 21–32)
COMMENT, HOLDF: NORMAL
COVID-19 RAPID TEST, COVR: NOT DETECTED
CREAT SERPL-MCNC: 0.98 MG/DL (ref 0.7–1.3)
CREAT SERPL-MCNC: 1.25 MG/DL (ref 0.7–1.3)
D50 ADMINISTERED, D50ADM: 0 ML
D50 ORDER, D50ORD: 0 ML
DIFFERENTIAL METHOD BLD: ABNORMAL
EOSINOPHIL # BLD: 0 K/UL (ref 0–0.4)
EOSINOPHIL NFR BLD: 0 % (ref 0–7)
ERYTHROCYTE [DISTWIDTH] IN BLOOD BY AUTOMATED COUNT: 11.2 % (ref 11.5–14.5)
GLOBULIN SER CALC-MCNC: 3.8 G/DL (ref 2–4)
GLSCOM COMMENTS: NORMAL
GLUCOSE BLD STRIP.AUTO-MCNC: 389 MG/DL (ref 65–117)
GLUCOSE BLD STRIP.AUTO-MCNC: 396 MG/DL (ref 65–117)
GLUCOSE SERPL-MCNC: 393 MG/DL (ref 65–100)
GLUCOSE SERPL-MCNC: 421 MG/DL (ref 65–100)
GLUCOSE, GLC: 396 MG/DL
HCT VFR BLD AUTO: 50.3 % (ref 36.6–50.3)
HGB BLD-MCNC: 17.5 G/DL (ref 12.1–17)
HIGH TARGET, HITG: 250 MG/DL
IMM GRANULOCYTES # BLD AUTO: 0 K/UL (ref 0–0.04)
IMM GRANULOCYTES NFR BLD AUTO: 0 % (ref 0–0.5)
INSULIN ADMINSTERED, INSADM: 6.7 UNITS/HOUR
INSULIN ORDER, INSORD: 6.7 UNITS/HOUR
LOW TARGET, LOT: 150 MG/DL
LYMPHOCYTES # BLD: 2.3 K/UL (ref 0.8–3.5)
LYMPHOCYTES NFR BLD: 18 % (ref 12–49)
MAGNESIUM SERPL-MCNC: 2.3 MG/DL (ref 1.6–2.4)
MAGNESIUM SERPL-MCNC: 2.6 MG/DL (ref 1.6–2.4)
MCH RBC QN AUTO: 34 PG (ref 26–34)
MCHC RBC AUTO-ENTMCNC: 34.8 G/DL (ref 30–36.5)
MCV RBC AUTO: 97.9 FL (ref 80–99)
MINUTES UNTIL NEXT BG, NBG: 60 MIN
MONOCYTES # BLD: 0.8 K/UL (ref 0–1)
MONOCYTES NFR BLD: 7 % (ref 5–13)
MULTIPLIER, MUL: 0.02
NEUTS SEG # BLD: 9.7 K/UL (ref 1.8–8)
NEUTS SEG NFR BLD: 74 % (ref 32–75)
NRBC # BLD: 0 K/UL (ref 0–0.01)
NRBC BLD-RTO: 0 PER 100 WBC
ORDER INITIALS, ORDINIT: NORMAL
PHOSPHATE SERPL-MCNC: 4.1 MG/DL (ref 2.6–4.7)
PLATELET # BLD AUTO: 226 K/UL (ref 150–400)
PMV BLD AUTO: 11 FL (ref 8.9–12.9)
POTASSIUM SERPL-SCNC: 4.9 MMOL/L (ref 3.5–5.1)
POTASSIUM SERPL-SCNC: 5.2 MMOL/L (ref 3.5–5.1)
PROT SERPL-MCNC: 8.7 G/DL (ref 6.4–8.2)
RBC # BLD AUTO: 5.14 M/UL (ref 4.1–5.7)
SAMPLES BEING HELD,HOLD: NORMAL
SERVICE CMNT-IMP: ABNORMAL
SERVICE CMNT-IMP: ABNORMAL
SODIUM SERPL-SCNC: 128 MMOL/L (ref 136–145)
SODIUM SERPL-SCNC: 130 MMOL/L (ref 136–145)
SOURCE, COVRS: NORMAL
TROPONIN-HIGH SENSITIVITY: 4 NG/L (ref 0–76)
WBC # BLD AUTO: 13 K/UL (ref 4.1–11.1)

## 2021-10-21 PROCEDURE — 74011250636 HC RX REV CODE- 250/636

## 2021-10-21 PROCEDURE — 96374 THER/PROPH/DIAG INJ IV PUSH: CPT

## 2021-10-21 PROCEDURE — 83735 ASSAY OF MAGNESIUM: CPT

## 2021-10-21 PROCEDURE — 65660000000 HC RM CCU STEPDOWN

## 2021-10-21 PROCEDURE — 84100 ASSAY OF PHOSPHORUS: CPT

## 2021-10-21 PROCEDURE — 82962 GLUCOSE BLOOD TEST: CPT

## 2021-10-21 PROCEDURE — 85025 COMPLETE CBC W/AUTO DIFF WBC: CPT

## 2021-10-21 PROCEDURE — 74011250637 HC RX REV CODE- 250/637: Performed by: STUDENT IN AN ORGANIZED HEALTH CARE EDUCATION/TRAINING PROGRAM

## 2021-10-21 PROCEDURE — 71045 X-RAY EXAM CHEST 1 VIEW: CPT

## 2021-10-21 PROCEDURE — 74011636637 HC RX REV CODE- 636/637: Performed by: STUDENT IN AN ORGANIZED HEALTH CARE EDUCATION/TRAINING PROGRAM

## 2021-10-21 PROCEDURE — 74011000250 HC RX REV CODE- 250: Performed by: STUDENT IN AN ORGANIZED HEALTH CARE EDUCATION/TRAINING PROGRAM

## 2021-10-21 PROCEDURE — 36415 COLL VENOUS BLD VENIPUNCTURE: CPT

## 2021-10-21 PROCEDURE — 93005 ELECTROCARDIOGRAM TRACING: CPT

## 2021-10-21 PROCEDURE — 80053 COMPREHEN METABOLIC PANEL: CPT

## 2021-10-21 PROCEDURE — 74011000258 HC RX REV CODE- 258: Performed by: STUDENT IN AN ORGANIZED HEALTH CARE EDUCATION/TRAINING PROGRAM

## 2021-10-21 PROCEDURE — 84484 ASSAY OF TROPONIN QUANT: CPT

## 2021-10-21 PROCEDURE — 74011250636 HC RX REV CODE- 250/636: Performed by: EMERGENCY MEDICINE

## 2021-10-21 PROCEDURE — 83036 HEMOGLOBIN GLYCOSYLATED A1C: CPT

## 2021-10-21 PROCEDURE — 82550 ASSAY OF CK (CPK): CPT

## 2021-10-21 PROCEDURE — 99285 EMERGENCY DEPT VISIT HI MDM: CPT

## 2021-10-21 PROCEDURE — 87635 SARS-COV-2 COVID-19 AMP PRB: CPT

## 2021-10-21 PROCEDURE — 96375 TX/PRO/DX INJ NEW DRUG ADDON: CPT

## 2021-10-21 RX ORDER — INSULIN LISPRO 100 [IU]/ML
INJECTION, SOLUTION INTRAVENOUS; SUBCUTANEOUS
Status: DISCONTINUED | OUTPATIENT
Start: 2021-10-22 | End: 2021-10-23

## 2021-10-21 RX ORDER — MAGNESIUM SULFATE 100 %
4 CRYSTALS MISCELLANEOUS AS NEEDED
Status: DISCONTINUED | OUTPATIENT
Start: 2021-10-21 | End: 2021-10-23

## 2021-10-21 RX ORDER — ONDANSETRON 2 MG/ML
INJECTION INTRAMUSCULAR; INTRAVENOUS
Status: COMPLETED
Start: 2021-10-21 | End: 2021-10-21

## 2021-10-21 RX ORDER — ONDANSETRON 2 MG/ML
4 INJECTION INTRAMUSCULAR; INTRAVENOUS
Status: COMPLETED | OUTPATIENT
Start: 2021-10-21 | End: 2021-10-21

## 2021-10-21 RX ORDER — ACETAMINOPHEN 325 MG/1
650 TABLET ORAL
Status: DISCONTINUED | OUTPATIENT
Start: 2021-10-21 | End: 2021-10-23 | Stop reason: HOSPADM

## 2021-10-21 RX ORDER — DEXTROSE 50 % IN WATER (D50W) INTRAVENOUS SYRINGE
25-50 AS NEEDED
Status: DISCONTINUED | OUTPATIENT
Start: 2021-10-21 | End: 2021-10-23

## 2021-10-21 RX ORDER — ONDANSETRON 2 MG/ML
4 INJECTION INTRAMUSCULAR; INTRAVENOUS ONCE
Status: COMPLETED | OUTPATIENT
Start: 2021-10-21 | End: 2021-10-21

## 2021-10-21 RX ADMIN — ALUMINUM HYDROXIDE AND MAGNESIUM HYDROXIDE 40 ML: 200; 200 SUSPENSION ORAL at 23:06

## 2021-10-21 RX ADMIN — ONDANSETRON 4 MG: 2 INJECTION INTRAMUSCULAR; INTRAVENOUS at 22:10

## 2021-10-21 RX ADMIN — SODIUM CHLORIDE 2000 ML: 9 INJECTION, SOLUTION INTRAVENOUS at 21:48

## 2021-10-21 RX ADMIN — SODIUM CHLORIDE 6.7 UNITS/HR: 9 INJECTION, SOLUTION INTRAVENOUS at 23:21

## 2021-10-21 RX ADMIN — ONDANSETRON 4 MG: 2 INJECTION INTRAMUSCULAR; INTRAVENOUS at 23:06

## 2021-10-22 LAB
ADMINISTERED INITIALS, ADMINIT: NORMAL
ANION GAP SERPL CALC-SCNC: 17 MMOL/L (ref 5–15)
ANION GAP SERPL CALC-SCNC: 17 MMOL/L (ref 5–15)
ANION GAP SERPL CALC-SCNC: 22 MMOL/L (ref 5–15)
ANION GAP SERPL CALC-SCNC: 4 MMOL/L (ref 5–15)
ANION GAP SERPL CALC-SCNC: 7 MMOL/L (ref 5–15)
APPEARANCE UR: CLEAR
ATRIAL RATE: 122 BPM
BACTERIA URNS QL MICRO: NEGATIVE /HPF
BILIRUB UR QL: NEGATIVE
BUN SERPL-MCNC: 10 MG/DL (ref 6–20)
BUN SERPL-MCNC: 11 MG/DL (ref 6–20)
BUN SERPL-MCNC: 12 MG/DL (ref 6–20)
BUN SERPL-MCNC: 13 MG/DL (ref 6–20)
BUN SERPL-MCNC: 9 MG/DL (ref 6–20)
BUN/CREAT SERPL: 11 (ref 12–20)
BUN/CREAT SERPL: 14 (ref 12–20)
BUN/CREAT SERPL: 8 (ref 12–20)
BUN/CREAT SERPL: 9 (ref 12–20)
BUN/CREAT SERPL: 9 (ref 12–20)
CALCIUM SERPL-MCNC: 8.4 MG/DL (ref 8.5–10.1)
CALCIUM SERPL-MCNC: 8.4 MG/DL (ref 8.5–10.1)
CALCIUM SERPL-MCNC: 8.5 MG/DL (ref 8.5–10.1)
CALCIUM SERPL-MCNC: 8.7 MG/DL (ref 8.5–10.1)
CALCIUM SERPL-MCNC: 8.9 MG/DL (ref 8.5–10.1)
CALCULATED P AXIS, ECG09: 80 DEGREES
CALCULATED R AXIS, ECG10: 88 DEGREES
CALCULATED T AXIS, ECG11: 68 DEGREES
CHLORIDE SERPL-SCNC: 102 MMOL/L (ref 97–108)
CHLORIDE SERPL-SCNC: 106 MMOL/L (ref 97–108)
CHLORIDE SERPL-SCNC: 106 MMOL/L (ref 97–108)
CHLORIDE SERPL-SCNC: 112 MMOL/L (ref 97–108)
CHLORIDE SERPL-SCNC: 114 MMOL/L (ref 97–108)
CO2 SERPL-SCNC: 11 MMOL/L (ref 21–32)
CO2 SERPL-SCNC: 12 MMOL/L (ref 21–32)
CO2 SERPL-SCNC: 19 MMOL/L (ref 21–32)
CO2 SERPL-SCNC: 22 MMOL/L (ref 21–32)
CO2 SERPL-SCNC: 8 MMOL/L (ref 21–32)
COLOR UR: ABNORMAL
CREAT SERPL-MCNC: 0.96 MG/DL (ref 0.7–1.3)
CREAT SERPL-MCNC: 1 MG/DL (ref 0.7–1.3)
CREAT SERPL-MCNC: 1.09 MG/DL (ref 0.7–1.3)
CREAT SERPL-MCNC: 1.19 MG/DL (ref 0.7–1.3)
CREAT SERPL-MCNC: 1.21 MG/DL (ref 0.7–1.3)
D50 ADMINISTERED, D50ADM: 0 ML
D50 ORDER, D50ORD: 0 ML
DIAGNOSIS, 93000: NORMAL
EPITH CASTS URNS QL MICRO: ABNORMAL /LPF
EST. AVERAGE GLUCOSE BLD GHB EST-MCNC: 229 MG/DL
GLSCOM COMMENTS: NORMAL
GLUCOSE BLD STRIP.AUTO-MCNC: 108 MG/DL (ref 65–117)
GLUCOSE BLD STRIP.AUTO-MCNC: 111 MG/DL (ref 65–117)
GLUCOSE BLD STRIP.AUTO-MCNC: 131 MG/DL (ref 65–117)
GLUCOSE BLD STRIP.AUTO-MCNC: 138 MG/DL (ref 65–117)
GLUCOSE BLD STRIP.AUTO-MCNC: 143 MG/DL (ref 65–117)
GLUCOSE BLD STRIP.AUTO-MCNC: 146 MG/DL (ref 65–117)
GLUCOSE BLD STRIP.AUTO-MCNC: 165 MG/DL (ref 65–117)
GLUCOSE BLD STRIP.AUTO-MCNC: 178 MG/DL (ref 65–117)
GLUCOSE BLD STRIP.AUTO-MCNC: 182 MG/DL (ref 65–117)
GLUCOSE BLD STRIP.AUTO-MCNC: 197 MG/DL (ref 65–117)
GLUCOSE BLD STRIP.AUTO-MCNC: 209 MG/DL (ref 65–117)
GLUCOSE BLD STRIP.AUTO-MCNC: 231 MG/DL (ref 65–117)
GLUCOSE BLD STRIP.AUTO-MCNC: 247 MG/DL (ref 65–117)
GLUCOSE BLD STRIP.AUTO-MCNC: 253 MG/DL (ref 65–117)
GLUCOSE BLD STRIP.AUTO-MCNC: 254 MG/DL (ref 65–117)
GLUCOSE BLD STRIP.AUTO-MCNC: 262 MG/DL (ref 65–117)
GLUCOSE BLD STRIP.AUTO-MCNC: 285 MG/DL (ref 65–117)
GLUCOSE BLD STRIP.AUTO-MCNC: 300 MG/DL (ref 65–117)
GLUCOSE BLD STRIP.AUTO-MCNC: 308 MG/DL (ref 65–117)
GLUCOSE BLD STRIP.AUTO-MCNC: 309 MG/DL (ref 65–117)
GLUCOSE BLD STRIP.AUTO-MCNC: 353 MG/DL (ref 65–117)
GLUCOSE SERPL-MCNC: 123 MG/DL (ref 65–100)
GLUCOSE SERPL-MCNC: 144 MG/DL (ref 65–100)
GLUCOSE SERPL-MCNC: 233 MG/DL (ref 65–100)
GLUCOSE SERPL-MCNC: 296 MG/DL (ref 65–100)
GLUCOSE SERPL-MCNC: 312 MG/DL (ref 65–100)
GLUCOSE UR STRIP.AUTO-MCNC: >1000 MG/DL
GLUCOSE, GLC: 108 MG/DL
GLUCOSE, GLC: 111 MG/DL
GLUCOSE, GLC: 131 MG/DL
GLUCOSE, GLC: 138 MG/DL
GLUCOSE, GLC: 146 MG/DL
GLUCOSE, GLC: 165 MG/DL
GLUCOSE, GLC: 178 MG/DL
GLUCOSE, GLC: 182 MG/DL
GLUCOSE, GLC: 197 MG/DL
GLUCOSE, GLC: 209 MG/DL
GLUCOSE, GLC: 231 MG/DL
GLUCOSE, GLC: 247 MG/DL
GLUCOSE, GLC: 253 MG/DL
GLUCOSE, GLC: 254 MG/DL
GLUCOSE, GLC: 262 MG/DL
GLUCOSE, GLC: 285 MG/DL
GLUCOSE, GLC: 300 MG/DL
GLUCOSE, GLC: 308 MG/DL
GLUCOSE, GLC: 309 MG/DL
GLUCOSE, GLC: 353 MG/DL
HBA1C MFR BLD: 9.6 % (ref 4–5.6)
HGB UR QL STRIP: NEGATIVE
HIGH TARGET, HITG: 250 MG/DL
HYALINE CASTS URNS QL MICRO: ABNORMAL /LPF (ref 0–5)
INSULIN ADMINSTERED, INSADM: 0 UNITS/HOUR
INSULIN ADMINSTERED, INSADM: 0 UNITS/HOUR
INSULIN ADMINSTERED, INSADM: 0.1 UNITS/HOUR
INSULIN ADMINSTERED, INSADM: 0.1 UNITS/HOUR
INSULIN ADMINSTERED, INSADM: 0.7 UNITS/HOUR
INSULIN ADMINSTERED, INSADM: 1.7 UNITS/HOUR
INSULIN ADMINSTERED, INSADM: 10 UNITS/HOUR
INSULIN ADMINSTERED, INSADM: 10.1 UNITS/HOUR
INSULIN ADMINSTERED, INSADM: 10.3 UNITS/HOUR
INSULIN ADMINSTERED, INSADM: 11.6 UNITS/HOUR
INSULIN ADMINSTERED, INSADM: 2.3 UNITS/HOUR
INSULIN ADMINSTERED, INSADM: 2.4 UNITS/HOUR
INSULIN ADMINSTERED, INSADM: 3.7 UNITS/HOUR
INSULIN ADMINSTERED, INSADM: 4.8 UNITS/HOUR
INSULIN ADMINSTERED, INSADM: 5.8 UNITS/HOUR
INSULIN ADMINSTERED, INSADM: 6.3 UNITS/HOUR
INSULIN ADMINSTERED, INSADM: 7.1 UNITS/HOUR
INSULIN ADMINSTERED, INSADM: 7.4 UNITS/HOUR
INSULIN ADMINSTERED, INSADM: 8.8 UNITS/HOUR
INSULIN ADMINSTERED, INSADM: 8.9 UNITS/HOUR
INSULIN ORDER, INSORD: 0 UNITS/HOUR
INSULIN ORDER, INSORD: 0 UNITS/HOUR
INSULIN ORDER, INSORD: 0.1 UNITS/HOUR
INSULIN ORDER, INSORD: 0.1 UNITS/HOUR
INSULIN ORDER, INSORD: 0.7 UNITS/HOUR
INSULIN ORDER, INSORD: 1.7 UNITS/HOUR
INSULIN ORDER, INSORD: 10 UNITS/HOUR
INSULIN ORDER, INSORD: 10.1 UNITS/HOUR
INSULIN ORDER, INSORD: 10.3 UNITS/HOUR
INSULIN ORDER, INSORD: 11.6 UNITS/HOUR
INSULIN ORDER, INSORD: 2.3 UNITS/HOUR
INSULIN ORDER, INSORD: 2.4 UNITS/HOUR
INSULIN ORDER, INSORD: 3.7 UNITS/HOUR
INSULIN ORDER, INSORD: 4.8 UNITS/HOUR
INSULIN ORDER, INSORD: 5.8 UNITS/HOUR
INSULIN ORDER, INSORD: 6.3 UNITS/HOUR
INSULIN ORDER, INSORD: 7.1 UNITS/HOUR
INSULIN ORDER, INSORD: 7.4 UNITS/HOUR
INSULIN ORDER, INSORD: 8.8 UNITS/HOUR
INSULIN ORDER, INSORD: 8.9 UNITS/HOUR
KETONES UR QL STRIP.AUTO: >80 MG/DL
LACTATE SERPL-SCNC: 1.1 MMOL/L (ref 0.4–2)
LEUKOCYTE ESTERASE UR QL STRIP.AUTO: NEGATIVE
LOW TARGET, LOT: 150 MG/DL
MAGNESIUM SERPL-MCNC: 2.1 MG/DL (ref 1.6–2.4)
MAGNESIUM SERPL-MCNC: 2.3 MG/DL (ref 1.6–2.4)
MAGNESIUM SERPL-MCNC: 2.3 MG/DL (ref 1.6–2.4)
MAGNESIUM SERPL-MCNC: 2.4 MG/DL (ref 1.6–2.4)
MAGNESIUM SERPL-MCNC: 2.4 MG/DL (ref 1.6–2.4)
MINUTES UNTIL NEXT BG, NBG: 120 MIN
MINUTES UNTIL NEXT BG, NBG: 60 MIN
MULTIPLIER, MUL: 0
MULTIPLIER, MUL: 0.01
MULTIPLIER, MUL: 0.01
MULTIPLIER, MUL: 0.02
MULTIPLIER, MUL: 0.02
MULTIPLIER, MUL: 0.03
MULTIPLIER, MUL: 0.04
MULTIPLIER, MUL: 0.05
MULTIPLIER, MUL: 0.05
MULTIPLIER, MUL: 0.06
NITRITE UR QL STRIP.AUTO: NEGATIVE
ORDER INITIALS, ORDINIT: NORMAL
P-R INTERVAL, ECG05: 130 MS
PH UR STRIP: 5 [PH] (ref 5–8)
PHOSPHATE SERPL-MCNC: 2.9 MG/DL (ref 2.6–4.7)
POTASSIUM SERPL-SCNC: 3.6 MMOL/L (ref 3.5–5.1)
POTASSIUM SERPL-SCNC: 4 MMOL/L (ref 3.5–5.1)
POTASSIUM SERPL-SCNC: 4.1 MMOL/L (ref 3.5–5.1)
POTASSIUM SERPL-SCNC: 4.2 MMOL/L (ref 3.5–5.1)
POTASSIUM SERPL-SCNC: 5.4 MMOL/L (ref 3.5–5.1)
PROCALCITONIN SERPL-MCNC: 0.66 NG/ML
PROT UR STRIP-MCNC: NEGATIVE MG/DL
Q-T INTERVAL, ECG07: 348 MS
QRS DURATION, ECG06: 80 MS
QTC CALCULATION (BEZET), ECG08: 495 MS
RBC #/AREA URNS HPF: ABNORMAL /HPF (ref 0–5)
SERVICE CMNT-IMP: ABNORMAL
SERVICE CMNT-IMP: NORMAL
SERVICE CMNT-IMP: NORMAL
SODIUM SERPL-SCNC: 132 MMOL/L (ref 136–145)
SODIUM SERPL-SCNC: 134 MMOL/L (ref 136–145)
SODIUM SERPL-SCNC: 135 MMOL/L (ref 136–145)
SODIUM SERPL-SCNC: 138 MMOL/L (ref 136–145)
SODIUM SERPL-SCNC: 140 MMOL/L (ref 136–145)
SP GR UR REFRACTOMETRY: 1.02 (ref 1–1.03)
UA: UC IF INDICATED,UAUC: ABNORMAL
UROBILINOGEN UR QL STRIP.AUTO: 0.2 EU/DL (ref 0.2–1)
VENTRICULAR RATE, ECG03: 122 BPM
WBC URNS QL MICRO: ABNORMAL /HPF (ref 0–4)

## 2021-10-22 PROCEDURE — 74011000250 HC RX REV CODE- 250: Performed by: INTERNAL MEDICINE

## 2021-10-22 PROCEDURE — 36415 COLL VENOUS BLD VENIPUNCTURE: CPT

## 2021-10-22 PROCEDURE — 74011000258 HC RX REV CODE- 258: Performed by: STUDENT IN AN ORGANIZED HEALTH CARE EDUCATION/TRAINING PROGRAM

## 2021-10-22 PROCEDURE — 83735 ASSAY OF MAGNESIUM: CPT

## 2021-10-22 PROCEDURE — 74011000258 HC RX REV CODE- 258: Performed by: INTERNAL MEDICINE

## 2021-10-22 PROCEDURE — 81001 URINALYSIS AUTO W/SCOPE: CPT

## 2021-10-22 PROCEDURE — 84145 PROCALCITONIN (PCT): CPT

## 2021-10-22 PROCEDURE — 80048 BASIC METABOLIC PNL TOTAL CA: CPT

## 2021-10-22 PROCEDURE — 82962 GLUCOSE BLOOD TEST: CPT

## 2021-10-22 PROCEDURE — 84100 ASSAY OF PHOSPHORUS: CPT

## 2021-10-22 PROCEDURE — 74011636637 HC RX REV CODE- 636/637: Performed by: INTERNAL MEDICINE

## 2021-10-22 PROCEDURE — 99233 SBSQ HOSP IP/OBS HIGH 50: CPT | Performed by: CLINICAL NURSE SPECIALIST

## 2021-10-22 PROCEDURE — 74011250636 HC RX REV CODE- 250/636: Performed by: INTERNAL MEDICINE

## 2021-10-22 PROCEDURE — 65660000001 HC RM ICU INTERMED STEPDOWN

## 2021-10-22 PROCEDURE — 83605 ASSAY OF LACTIC ACID: CPT

## 2021-10-22 PROCEDURE — 74011636637 HC RX REV CODE- 636/637: Performed by: STUDENT IN AN ORGANIZED HEALTH CARE EDUCATION/TRAINING PROGRAM

## 2021-10-22 RX ORDER — ENOXAPARIN SODIUM 100 MG/ML
40 INJECTION SUBCUTANEOUS EVERY 24 HOURS
Status: DISCONTINUED | OUTPATIENT
Start: 2021-10-22 | End: 2021-10-22

## 2021-10-22 RX ORDER — MAGNESIUM SULFATE 100 %
4 CRYSTALS MISCELLANEOUS AS NEEDED
Status: DISCONTINUED | OUTPATIENT
Start: 2021-10-22 | End: 2021-10-22 | Stop reason: SDUPTHER

## 2021-10-22 RX ORDER — INSULIN LISPRO 100 [IU]/ML
INJECTION, SOLUTION INTRAVENOUS; SUBCUTANEOUS
Status: DISCONTINUED | OUTPATIENT
Start: 2021-10-22 | End: 2021-10-23 | Stop reason: SDUPTHER

## 2021-10-22 RX ORDER — HEPARIN SODIUM 5000 [USP'U]/ML
5000 INJECTION, SOLUTION INTRAVENOUS; SUBCUTANEOUS EVERY 12 HOURS
Status: DISCONTINUED | OUTPATIENT
Start: 2021-10-22 | End: 2021-10-23 | Stop reason: HOSPADM

## 2021-10-22 RX ORDER — SODIUM CHLORIDE 9 MG/ML
125 INJECTION, SOLUTION INTRAVENOUS CONTINUOUS
Status: DISCONTINUED | OUTPATIENT
Start: 2021-10-22 | End: 2021-10-22

## 2021-10-22 RX ORDER — DEXTROSE 50 % IN WATER (D50W) INTRAVENOUS SYRINGE
25-50 AS NEEDED
Status: DISCONTINUED | OUTPATIENT
Start: 2021-10-22 | End: 2021-10-22 | Stop reason: SDUPTHER

## 2021-10-22 RX ORDER — ONDANSETRON 2 MG/ML
4 INJECTION INTRAMUSCULAR; INTRAVENOUS
Status: DISCONTINUED | OUTPATIENT
Start: 2021-10-22 | End: 2021-10-23 | Stop reason: HOSPADM

## 2021-10-22 RX ORDER — DEXTROSE, SODIUM CHLORIDE, AND POTASSIUM CHLORIDE 5; .9; .15 G/100ML; G/100ML; G/100ML
200 INJECTION INTRAVENOUS CONTINUOUS
Status: DISCONTINUED | OUTPATIENT
Start: 2021-10-22 | End: 2021-10-23

## 2021-10-22 RX ADMIN — SODIUM CHLORIDE 7.4 UNITS/HR: 9 INJECTION, SOLUTION INTRAVENOUS at 12:49

## 2021-10-22 RX ADMIN — POTASSIUM CHLORIDE, DEXTROSE MONOHYDRATE AND SODIUM CHLORIDE 200 ML/HR: 150; 5; 900 INJECTION, SOLUTION INTRAVENOUS at 10:43

## 2021-10-22 RX ADMIN — ONDANSETRON 4 MG: 2 INJECTION INTRAMUSCULAR; INTRAVENOUS at 08:20

## 2021-10-22 RX ADMIN — SODIUM CHLORIDE 100 ML/HR: 9 INJECTION, SOLUTION INTRAVENOUS at 02:31

## 2021-10-22 RX ADMIN — POTASSIUM CHLORIDE, DEXTROSE MONOHYDRATE AND SODIUM CHLORIDE 200 ML/HR: 150; 5; 900 INJECTION, SOLUTION INTRAVENOUS at 16:18

## 2021-10-22 RX ADMIN — PROCHLORPERAZINE EDISYLATE 10 MG: 5 INJECTION INTRAMUSCULAR; INTRAVENOUS at 10:11

## 2021-10-22 RX ADMIN — POTASSIUM CHLORIDE, DEXTROSE MONOHYDRATE AND SODIUM CHLORIDE 200 ML/HR: 150; 5; 900 INJECTION, SOLUTION INTRAVENOUS at 21:18

## 2021-10-22 RX ADMIN — SODIUM CHLORIDE 11.6 UNITS/HR: 9 INJECTION, SOLUTION INTRAVENOUS at 17:16

## 2021-10-22 RX ADMIN — SODIUM CHLORIDE 5.8 UNITS/HR: 9 INJECTION, SOLUTION INTRAVENOUS at 01:27

## 2021-10-22 NOTE — PROGRESS NOTES
Comprehensive Nutrition Assessment    Type and Reason for Visit: Initial (low BMI)    Nutrition Recommendations/Plan:   Start diet when medically appropriate  Recommend adding Ensure HP (low calorie, low CHO, high protein supplement) when diet advances  Please document % meals and supplements consumed in flowsheet I/O's under intake     Recommend f/u with outpatient RD and/or DM specialist    Nutrition Assessment:      Chart reviewed for low BMI. Pt noted for DKA, DM1 managed with insulin pump, N/V ongoing. Hx of vitamin D defiency. NPO at this time and still on insulin drip. Pt still feeling poorly with N/V at visit this morning, trying a different anti-emetic and mother at bedside. He reports no issues with his appetite prior to getting sick just before admission. When asked about his diet management, he reports having 3 meals per day and knows what he should be eating, but \"it could be better. \" Pt declined diet education at this time. DM Clinical Nurse Specialist consulted for insulin pump management/adjustments. If accurate, EMR shows weight loss of 14lb (10%) since July. Suspect weight loss is d/t poorly controlled BG. Pt feeling poorly and wrapped up in blankets, so unable to conduct nutrition focused physical exam at this time. Will continue monitoring and f/u. Wt Readings from Last 5 Encounters:   10/21/21 58.2 kg (128 lb 4.9 oz)   07/02/21 64.4 kg (142 lb)   02/06/21 59 kg (130 lb)   07/01/19 61.5 kg (135 lb 8 oz)   12/26/18 59 kg (130 lb)   ]    Estimated Daily Nutrient Needs:  Energy (kcal): 2322 kcals (BMR x 1.3AF + 250); Weight Used for Energy Requirements: Current  Protein (g): 58-70g (1.0-1.2g/kg); Weight Used for Protein Requirements: Current  Fluid (ml/day): 2300mL; Method Used for Fluid Requirements: 1 ml/kcal      Nutrition Related Findings:  Labs: A1c 9.6, -247. Meds: D5 NS w/ KCl, insulin gtt, zofran, compazine. BM PTA.       Wounds:    None       Current Nutrition Therapies:  DIET NPO    Anthropometric Measures:  · Height:  5' 11\" (180.3 cm)  · Current Body Wt:  58.2 kg (128 lb 4.9 oz)   · Ideal Body Wt:  172 lbs:  74.6 %   · BMI Category:  Underweight (BMI less than 18.5)       Nutrition Diagnosis:   · Altered nutrition-related lab values related to endocrine dysfunction as evidenced by  (A1c 9.6, DKA)      Nutrition Interventions:   Food and/or Nutrient Delivery: Start oral diet, Start oral nutrition supplement (when medically appropriate)  Nutrition Education and Counseling: No recommendations at this time  Coordination of Nutrition Care: Continue to monitor while inpatient, Interdisciplinary rounds    Goals:  Diet advanced with PO intake >50% meals/supplements and BG WNL next 2-4 days       Nutrition Monitoring and Evaluation:   Behavioral-Environmental Outcomes: None identified  Food/Nutrient Intake Outcomes: Diet advancement/tolerance, Food and nutrient intake, Supplement intake  Physical Signs/Symptoms Outcomes: Biochemical data, Nausea/vomiting, Weight    Discharge Planning:    Recommend pursue outpatient diabetes education, Recommend pursue outpatient nutrition counseling ([facility])     Electronically signed by Olga Davies RD on 10/22/2021 at 10:52 AM    Contact: [phone]

## 2021-10-22 NOTE — ED NOTES
Patient presents with c/p feeling nausea since this morning. Patient reports throwing up 8x around 230pm while on his way to get blood sugar monitoring. Patient ate crackers and ginger ale and felt better. Once resting patient reports having chest pain, arm pain, and vomiting. Patient has type 1 diabetes that is managed through his pump that has reporting blood sugar of 511, then 350. Patient denies feeling sick prior or and problems with pump. Patient reports SOB that started an hour ago.

## 2021-10-22 NOTE — PROGRESS NOTES
10/22/21 1118   Vital Signs   Temp 98.3 °F (36.8 °C)   Temp Source Oral   Pulse (Heart Rate) (!) 111   Heart Rate Source Monitor   Cardiac Rhythm Sinus Tachy   Resp Rate 20   O2 Sat (%) 95 %   Level of Consciousness Alert (0)   BP (!) 122/58   MAP (Monitor) 76   MAP (Calculated) 79   BP 1 Method Automatic   BP 1 Location Right upper arm   BP Patient Position At rest   MEWS Score 3   Pain 1   Pain Scale 1 Numeric (0 - 10)   Pain Intensity 1 0   Patient Stated Pain Goal 0   Pain Reassessment 1 Yes   Oxygen Therapy   Pulse via Oximetry 111 beats per minute   MEWS of 3 d/t HR and BP. Patient has had 2 vomiting episodes and new fluids D5 0.9% NS 20meq 200ml/hr started. MD aware.  Will monitor

## 2021-10-22 NOTE — ED PROVIDER NOTES
EMERGENCY DEPARTMENT HISTORY AND PHYSICAL EXAM          Date: 10/21/2021  Patient Name: Rosemary Winter  Attending of Record: Felipane José Luis    History of Presenting Illness     Chief Complaint   Patient presents with    Chest Pain     Pt presents to ed due to central upper chest pain that began around 2pm along with associated shortness of breath. Pt denies cough. Pt also notes n/v that began around the same time. Pt is type 1 DM and on insulin pump, he reports his bg has been high.  Vomiting     Pt denies diarrhea. History Provided By: Patient    HPI: Rosemary Winter is a 27 y.o. male with a PMH of DM1 who is presenting to the emergency department with chest pain, nausea, and hyperglycemia. He reports that this morning, he woke up feeling nauseous. He had an episode of emesis, and felt better afterwards. His BG readings were elevated throughout the day. This evening, he was watching a movie, when he felt nauseous and threw up again. He was noted to have BG of 500 at that time. Additionally he began experiencing bilateral chest and arm pain this afternoon that is ongoing. He denies any recent fevers, syncope, cough, congestion, sore throat, abd pain, dysuria, hemautria, rashes. ROS otherwise negative. PCP: Richard Munoz MD    There are no other complaints, changes, or physical findings at this time.      Current Facility-Administered Medications   Medication Dose Route Frequency Provider Last Rate Last Admin    ondansetron (ZOFRAN) 4 mg/2 mL injection             [START ON 10/22/2021] insulin lispro (HUMALOG) injection   SubCUTAneous TIDAC Kaylie Quach MD        glucose chewable tablet 16 g  4 Tablet Oral PRN Kaylie Quach MD        dextrose (D50W) injection syrg 12.5-25 g  25-50 mL IntraVENous PRN Kaylie Quach MD        glucagon [facility]) injection 1 mg  1 mg IntraMUSCular PRN Kaylie Quach MD        insulin regular (NOVOLIN R, HUMULIN R) 100 Units in 0.9% sodium chloride 100 mL infusion  0-50 Units/hr IntraVENous TITRATE Mariposa Taylor MD        maalox/viscous lidocaine (COV GI COCKTAIL)  40 mL Oral Salvatore Clifton MD        ondansetron [facility]) injection 4 mg  4 mg IntraVENous Salvatore Clifton MD         Current Outpatient Medications   Medication Sig Dispense Refill    insulin aspart U-100 (NOVOLOG U-100 INSULIN ASPART) 100 unit/mL injection USE AS DIRECTED INFUSE VIA INSULIN PUMP. MAX DOSE 100 UNITS PER DAY 90 mL 4    Blood-Glucose Meter (ONETOUCH ULTRA2) monitoring kit Use to test blood sugars 4 times per day. DX Code: E10.9 1 Kit 0    glucose blood VI test strips (ASCENSIA AUTODISC VI, ONE TOUCH ULTRA TEST VI) strip Check blood sugar 4x/day, Diagnosis E10.9 400 Strip 3    Lancets (ONE TOUCH DELICA) Misc To use up to 100 units daily 1 Package 11       Past History     Past Medical History:  Past Medical History:   Diagnosis Date    DM type 1 (diabetes mellitus, type 1) ([facility] [address]     Vitamin D deficiency        Past Surgical History:  Past Surgical History:   Procedure Laterality Date    HX TYMPANOSTOMY         Family History:  Family History   Problem Relation Age of Onset    Alcohol abuse Paternal Grandfather         cancer, type 2    Thyroid Disease Paternal Grandmother     Diabetes Neg Hx        Social History:  Social History     Tobacco Use    Smoking status: Former Smoker     Quit date: 2017     Years since quittin.1    Smokeless tobacco: Former User    Tobacco comment: VAPES   Substance Use Topics    Alcohol use: No    Drug use: No       Allergies: Allergies   Allergen Reactions    Latex Rash         Review of Systems   Review of Systems   Constitutional: Negative for chills and fever. HENT: Negative for congestion and sore throat. Respiratory: Positive for shortness of breath. Negative for cough. Cardiovascular: Positive for chest pain. Negative for leg swelling. Gastrointestinal: Positive for nausea and vomiting.  Negative for abdominal pain and diarrhea. Genitourinary: Negative for dysuria and hematuria. Musculoskeletal: Positive for myalgias (BUE). Skin: Negative for rash. Neurological: Negative for light-headedness and headaches. Hematological: Does not bruise/bleed easily. All other systems reviewed and are negative. Physical Exam   Physical Exam  Constitutional:       General: He is not in acute distress. HENT:      Head: Normocephalic and atraumatic. Nose: Nose normal.   Eyes:      Conjunctiva/sclera: Conjunctivae normal.   Cardiovascular:      Comments: Normal peripheral perfusion  Pulmonary:      Effort: Pulmonary effort is normal. No respiratory distress. Abdominal:      General: There is no distension. Musculoskeletal:         General: No swelling or deformity. Cervical back: Neck supple. Skin:     General: Skin is warm and dry. Neurological:      Mental Status: He is alert and oriented to person, place, and time.       Comments: Moving all extremities spontaneously    Psychiatric:         Mood and Affect: Mood normal.         Behavior: Behavior normal.          Diagnostic Study Results     Labs -     Recent Results (from the past 12 hour(s))   GLUCOSE, POC    Collection Time: 10/21/21  9:25 PM   Result Value Ref Range    Glucose (POC) 389 (H) 65 - 117 mg/dL    Performed by [facility]    EKG, 12 LEAD, INITIAL    Collection Time: 10/21/21  9:28 PM   Result Value Ref Range    Ventricular Rate 122 BPM    Atrial Rate 122 BPM    P-R Interval 130 ms    QRS Duration 80 ms    Q-T Interval 348 ms    QTC Calculation (Bezet) 495 ms    Calculated P Axis 80 degrees    Calculated R Axis 88 degrees    Calculated T Axis 68 degrees    Diagnosis       Sinus tachycardia  Biatrial enlargement  No previous ECGs available     CBC WITH AUTOMATED DIFF    Collection Time: 10/21/21  9:34 PM   Result Value Ref Range    WBC 13.0 (H) 4.1 - 11.1 K/uL    RBC 5.14 4.10 - 5.70 M/uL    HGB 17.5 (H) 12.1 - 17.0 g/dL    HCT 50.3 36.6 - 50.3 %    MCV 97.9 80.0 - 99.0 FL    MCH 34.0 26.0 - 34.0 PG    MCHC 34.8 30.0 - 36.5 g/dL    RDW 11.2 (L) 11.5 - 14.5 %    PLATELET 498 668 - 784 K/uL    MPV 11.0 8.9 - 12.9 FL    NRBC 0.0 0  WBC    ABSOLUTE NRBC 0.00 0.00 - 0.01 K/uL    NEUTROPHILS 74 32 - 75 %    LYMPHOCYTES 18 12 - 49 %    MONOCYTES 7 5 - 13 %    EOSINOPHILS 0 0 - 7 %    BASOPHILS 1 0 - 1 %    IMMATURE GRANULOCYTES 0 0.0 - 0.5 %    ABS. NEUTROPHILS 9.7 (H) 1.8 - 8.0 K/UL    ABS. LYMPHOCYTES 2.3 0.8 - 3.5 K/UL    ABS. MONOCYTES 0.8 0.0 - 1.0 K/UL    ABS. EOSINOPHILS 0.0 0.0 - 0.4 K/UL    ABS. BASOPHILS 0.1 0.0 - 0.1 K/UL    ABS. IMM. GRANS. 0.0 0.00 - 0.04 K/UL    DF AUTOMATED     METABOLIC PANEL, COMPREHENSIVE    Collection Time: 10/21/21  9:34 PM   Result Value Ref Range    Sodium 128 (L) 136 - 145 mmol/L    Potassium 5.2 (H) 3.5 - 5.1 mmol/L    Chloride 92 (L) 97 - 108 mmol/L    CO2 14 (LL) 21 - 32 mmol/L    Anion gap 22 (H) 5 - 15 mmol/L    Glucose 421 (H) 65 - 100 mg/dL    BUN 15 6 - 20 MG/DL    Creatinine 1.25 0.70 - 1.30 MG/DL    BUN/Creatinine ratio 12 12 - 20      GFR est AA >60 >60 ml/min/1.73m2    GFR est non-AA >60 >60 ml/min/1.73m2    Calcium 10.0 8.5 - 10.1 MG/DL    Bilirubin, total 1.0 0.2 - 1.0 MG/DL    ALT (SGPT) 14 12 - 78 U/L    AST (SGOT) 24 15 - 37 U/L    Alk. phosphatase 102 45 - 117 U/L    Protein, total 8.7 (H) 6.4 - 8.2 g/dL    Albumin 4.9 3.5 - 5.0 g/dL    Globulin 3.8 2.0 - 4.0 g/dL    A-G Ratio 1.3 1.1 - 2.2     TROPONIN-HIGH SENSITIVITY    Collection Time: 10/21/21  9:34 PM   Result Value Ref Range    Troponin-High Sensitivity 4 0 - 76 ng/L   SAMPLES BEING HELD    Collection Time: 10/21/21  9:34 PM   Result Value Ref Range    SAMPLES BEING HELD LAV,SST,RD,PST     COMMENT        Add-on orders for these samples will be processed based on acceptable specimen integrity and analyte stability, which may vary by analyte. Radiologic Studies -   XR CHEST PORT   Final Result   No acute process.             CT Results  (Last 48 hours)    None        CXR Results  (Last 48 hours)               10/21/21 2245  XR CHEST PORT Final result    Impression:  No acute process. Narrative:  EXAM:  CR chest portable       INDICATION: Chest pain and vomiting       COMPARISON: 9/13/2016. TECHNIQUE: Portable AP semiupright chest view at 2242 hours       FINDINGS: The cardiomediastinal contours are within normal limits. The lungs and   pleural spaces are clear. There is no pneumothorax. The bones and upper abdomen   are unremarkable. Medical Decision Making   I am the first provider for this patient. I reviewed the vital signs, available nursing notes, past medical history, past surgical history, family history and social history. Vital Signs-Reviewed the patient's vital signs. Patient Vitals for the past 12 hrs:   Temp Pulse Resp BP SpO2   10/21/21 2143     100 %   10/21/21 2125 98.7 °F (37.1 °C) (!) 125 24 137/81 98 %       ED EKG interpretation:  Rhythm: sinus tachycardia; and regular . Rate (approx.): 122; Axis: normal; P wave: normal; QRS interval: normal ; ST/T wave: normal; in  Lead: all; This EKG was interpreted by Marilee Johnson MD,ED Provider. Records Reviewed: Nursing Notes and Old Medical Records    Provider Notes (Medical Decision Making):   Chidi Richard is a 28 yo M with hx as mentioned who is presenting to the emergency department with nausea, vomiting, hyperglycemia. He is tachycardic, but nontoxic appearing on exam. Presentation concerning for DKA. Labs ordered including CMP, CBC, mag, trop, UA, CK, rapid COVID. EKG and CXR ordered given SOB and chest pain. Patient has turned off insulin pump. Will treat hyperglycemia once CMP resulted. Pt will likely require admission to the hospital.     ED Course and Progress Notes:   Initial assessment performed.  The patients presenting problems have been discussed, and they are in agreement with the care plan formulated and outlined with them.  I have encouraged them to ask questions as they arise throughout their visit. ED Course as of Oct 21 2254   Thu Oct 21, 2021   2231 Labs consistent with DKA. Insulin drip ordered. Hospitalist paged.     [AC]      ED Course User Index  [AC] Spring Daniel MD     Diagnosis     Clinical Impression:   1. Type 1 diabetes mellitus with ketoacidosis without coma ([facility] [address]        Disposition:  Admitted. Discussed results of workup, diagnosis, and treatment plan with patient and his family. They are in agreement with plan.          Resident Signature:   Signed By: Yolie Salcedo MD     October 21, 2021

## 2021-10-22 NOTE — DIABETES MGMT
[address] [place] [address]    CLINICAL NURSE SPECIALIST CONSULT     Initial Presentation   Yesika Kim is a 27 y.o. male admitted from the ER with chest pain, nausea and high blood sugar. Known Type 1 diabetes on insulin pump therapy. Afebrile. ST. Normotensive  LAB: /AG 22. Kidney & liver parameters normal. Troponin Neg. Urinary glucose & ketones Positive  CXR: Neg    HX:   Past Medical History:   Diagnosis Date    DM type 1 (diabetes mellitus, type 1) (HCC)     Vitamin D deficiency       INITIAL DX: DKA    Current Treatment     TX: Fluid resuscitation. Insulin via [address] by Provider for advanced diabetes nursing assessment and care for:   [x] Transitioning off Heidy Mitts   [x] Inpatient management strategy  [x] Home management assessment    Hospital Course   Clinical progress has been uncomplicated. Diabetes History   This patient has had Type 1 diabetes since age 13. He did experience another episode of DKA when he was 17years old. This is his 3rd incident. He was under the care of Dr. Pablo Jara as a youngster, but transitioned to Solo Whelan MD (until he left); now he is seeing Dr. Judi Rock at [facility]. Medtronic 770G insulin pump (has sensor capability):  Basal rate: 24 hour total = 29.6 units     12M - 3am 0.9units/hr    3am - 8am  1.3    8am - 12N 1.2    12N - 12M 1.3  Bolus ratio: 1:8  Duration: 4 hours      Diabetes-related Medical History  Acute complications  DKA    Diabetes Medication History  Drug class Currently in use Discontinued Never used   Biguanide      DDP-4 inhibitor       Sulfonylurea      Thiazolidinedione      GLP-1 RA      SGLT-2 inhibitors      Basal insulin Medtronic 770G insulin pump     Bolus insulin Medtronic 770G insulin pump     Fixed Dose  Combinations        Subjective   I'm nauseous and threw up a bunch of times before I came in.     Patient reports the following home diabetes self-management practices:   Eating pattern - Eats whenever he feels like eating   [x] Not eating a carbohydrate-controlled mealplan  Physical activity pattern - Playing golf in the past 3 months   [x] Not employing a physical activity program to control BG  Monitoring pattern - Has both a Medtronic sensor and a DexCom CGM device (but does not use)   [x] Not testing BGs sufficiently to inform self-management adjustments  Taking medications pattern  [x] In-Consistent administration of mealtime insulin doses  [x] Affordable    Overall evaluation:    [x] Not achieving A1c target with drug therapy & self-care practices     Objective   Physical exam  General Underweight male Ill-appearing. Cooperative  Neuro  Alert, oriented   Vital Signs   Visit Vitals  BP (!) 122/58 (BP 1 Location: Right upper arm, BP Patient Position: At rest)   Pulse (!) 111   Temp 98.3 °F (36.8 °C)   Resp 20   Ht 5' 11\" (1.803 m)   Wt 58.2 kg (128 lb 4.9 oz)   SpO2 95%   BMI 17.90 kg/m²     Laboratory  Tests Today 10/21   A1c  9.6%    421   Anion gap 22 22   Serum triglycerides     WBC  13   Serum creatinine 1.09 1.25   GFR >60 >60   AST  24   ALT  14   Troponin  4     Factors impacting BG management  Factor Dose Comments   Nutrition:  NPO       Blood glucose pattern        Assessment and Plan   Nursing Diagnosis Risk for unstable blood glucose pattern   Nursing Intervention Domain 3734 Decision-making Support   Nursing Interventions Examined current inpatient diabetes control   Explored factors facilitating and impeding inpatient management  Identified self-management practices impeding diabetes control     Evaluation   This underweight  male, with Type 1 diabetes, did not achieve diabetes control prior to admission, as evidenced by A1c of 9.6%. He came through the ER; Glucostabilizer ([address] [place] [address]) was started. BGs came down to the 100s by 330am. The BGs trended back up since GS did not provide insulin based on programmed parameters. Now in the 300s.     This young man is not attending to his diabetes self-care. He wears the insulin pump and it delivers the basal insulin dose, but he infrequently is delivering boluses for meals (as noted in history). He has not bonded with his current endocrinologist. He lives at home and works full-time. Recommendations     [x] 500  Cc bolus of 1/2 NS    [x] Referral to  [x] [facility] - needs provider    Billing Code(s)   [x] 03692 IP subsequent hospital care - 35 minutes     Before making these care recommendations, I personally reviewed the hospitalization record, including notes, laboratory & diagnostic data and current medications, and examined the patient at the bedside (circumstances permitting) before making care recommendations.      Total minutes: Alice Chappell, CNS  Diabetes Clinical Nurse Specialist  Program for Diabetes Health  Access via [facility]

## 2021-10-22 NOTE — PROGRESS NOTES
Transition of Care Plan:    RUR:5% low risk  Disposition:home  Follow up appointments:PCP at the [facility] and specialists as indicated  DME needed:none  Transportation at Discharge: Mother Anup Kruger [phone]  Keys or means to access home: parents have access      IM Medicare Letter:N/A  Is patient a BCPI-A Bundle: N/A          If yes, was Bundle Letter given?:     Caregiver Contact: Mother Anup Kruger [phone], Father [phone]  Discharge Caregiver contacted prior to discharge? Reason for Admission: DKA                   RUR Score:  5% low risk                   Plan for utilizing home health: none         PCP: First and Last name:  Kesha Munguia, Genaro Gurrola     Name of Practice:    Are you a current patient: Yes/No: no   Approximate date of last visit: several years   Can you participate in a virtual visit with your PCP:                     Current Advanced Directive/Advance Care Plan:   Sheila Grissom (ACP) Conversation      Date of Conversation: 10/21/2021  Conducted with: Patient with Decision Making Capacity    Healthcare Decision Maker:     Primary Decision Maker: Andree Elder - Parent - [phone]    Primary Decision Maker: Wilhemenia Barthel - Parent - [phone]  Click here to complete [place] including selection of the Healthcare Decision Maker Relationship (ie \"Primary\")      Content/Action Overview:   DECLINED ACP conversation - will revisit periodically   Reviewed DNR/DNI and patient elects Full Code (Attempt Resuscitation)    Length of Voluntary ACP Conversation in minutes:  <16 minutes (Non-Billable)                        Transition of Care Plan:  Home    Initial note:  Chart reviewed. CHRISTIAN met with the pt and his mother to introduce role, verify demographics, and complete assessment. The pt lives with his parents in a 2 level home with 3 GENARO. He is independent with ADL's and IADL's and drives.   He has no HH/SNF/IPR hx. His preferred pharmacy is [facility] on [address]. He denies issues with med access. The pt no longer sees Dr. Mirella Reeves and goes to the [facility]. Pt requests ice chips. CM will notify MD during IDR. CM will continue to monitor for d/c needs. Care Management Interventions  PCP Verified by CM:  Yes  Palliative Care Criteria Met (RRAT>21 & CHF Dx)?: No  Transition of Care Consult (CM Consult): Discharge Planning  Support Systems: Spouse/Significant Other  Discharge Location  Discharge Placement: [place]     Bola Solorio MSN  Care Manager  [phone]

## 2021-10-22 NOTE — PROGRESS NOTES
Problem: Falls - Risk of  Goal: *Absence of Falls  Description: Document Jun Abbasi Fall Risk and appropriate interventions in the flowsheet. Outcome: Progressing Towards Goal  Note: Fall Risk Interventions:            Medication Interventions: Bed/chair exit alarm, Patient to call before getting OOB, Teach patient to arise slowly                   Problem: Patient Education: Go to Patient Education Activity  Goal: Patient/Family Education  Outcome: Progressing Towards Goal     Problem: Diabetes Self-Management  Goal: *Disease process and treatment process  Description: Define diabetes and identify own type of diabetes; list 3 options for treating diabetes. Outcome: Progressing Towards Goal  Goal: *Incorporating nutritional management into lifestyle  Description: Describe effect of type, amount and timing of food on blood glucose; list 3 methods for planning meals. Outcome: Progressing Towards Goal  Goal: *Incorporating physical activity into lifestyle  Description: State effect of exercise on blood glucose levels. Outcome: Progressing Towards Goal  Goal: *Developing strategies to promote health/change behavior  Description: Define the ABC's of diabetes; identify appropriate screenings, schedule and personal plan for screenings. Outcome: Progressing Towards Goal  Goal: *Using medications safely  Description: State effect of diabetes medications on diabetes; name diabetes medication taking, action and side effects. Outcome: Progressing Towards Goal  Goal: *Monitoring blood glucose, interpreting and using results  Description: Identify recommended blood glucose targets  and personal targets. Outcome: Progressing Towards Goal  Goal: *Prevention, detection, treatment of acute complications  Description: List symptoms of hyper- and hypoglycemia; describe how to treat low blood sugar and actions for lowering  high blood glucose level.   Outcome: Progressing Towards Goal  Goal: *Prevention, detection and treatment of chronic complications  Description: Define the natural course of diabetes and describe the relationship of blood glucose levels to long term complications of diabetes.   Outcome: Progressing Towards Goal  Goal: *Developing strategies to address psychosocial issues  Description: Describe feelings about living with diabetes; identify support needed and support network  Outcome: Progressing Towards Goal  Goal: *Insulin pump training  Outcome: Progressing Towards Goal  Goal: *Sick day guidelines  Outcome: Progressing Towards Goal  Goal: *Patient Specific Goal (EDIT GOAL, INSERT TEXT)  Outcome: Progressing Towards Goal     Problem: Patient Education: Go to Patient Education Activity  Goal: Patient/Family Education  Outcome: Progressing Towards Goal

## 2021-10-22 NOTE — H&P
Hospitalist Admission Note    NAME: Vinh Lawson   :  1995   MRN:  [id number]     Date/Time:  10/21/2021 11:37 PM    Patient PCP: Anyi Leon MD  ______________________________________________________________________  Given the patient's current clinical presentation, I have a high level of concern for decompensation if discharged from the emergency department. Complex decision making was performed, which includes reviewing the patient's available past medical records, laboratory results, and x-ray films. My assessment of this patient's clinical condition and my plan of care is as follows. Assessment / Plan:  DKA  Admit patient to stepdown  start patient on insulin drip  Monitoring BMP  IV fluid    Leukocytosis  Patient afebrile  UA negative  Chest x-ray negative for any acute process  Follow-up procalcitoninhold for antibiotic for now  Check lactic acid      Code Status: Full  Surrogate Decision Maker:    DVT Prophylaxis: lovenox  GI Prophylaxis: not indicated          Subjective:   CHIEF COMPLAINT: nausea and vomiting     HISTORY OF PRESENT ILLNESS:     22-year old male from home with past medical history significant for insulin-dependent diabetes presented to the hospital for evaluation of intractable nausea and vomiting associated with epigastric pain, work-up in ED was significant for elevated blood sugar, anion gap was found to be 22, UA was positive for ketones, patient was started on insulin drip. We were asked to admit for work up and evaluation of the above problems.      Past Medical History:   Diagnosis Date    DM type 1 (diabetes mellitus, type 1) ([facility] [address]     Vitamin D deficiency         Past Surgical History:   Procedure Laterality Date    HX TYMPANOSTOMY         Social History     Tobacco Use    Smoking status: Former Smoker     Quit date: 2017     Years since quittin.1    Smokeless tobacco: Former User    Tobacco comment: Cherelle Mott Substance Use Topics    Alcohol use: No        Family History   Problem Relation Age of Onset    Alcohol abuse Paternal Grandfather         cancer, type 2    Thyroid Disease Paternal Grandmother     Diabetes Neg Hx      Allergies   Allergen Reactions    Latex Rash        Prior to Admission medications    Medication Sig Start Date End Date Taking? Authorizing Provider   insulin aspart U-100 (NOVOLOG U-100 INSULIN ASPART) 100 unit/mL injection USE AS DIRECTED INFUSE VIA INSULIN PUMP. MAX DOSE 100 UNITS PER DAY 9/27/19   Rony Baird MD   Blood-Glucose Meter [facility]) monitoring kit Use to test blood sugars 4 times per day. DX Code: E10.9 2/1/18   Rony Baird MD   glucose blood VI test strips (ASCENSIA AUTODISC VI, ONE TOUCH ULTRA TEST VI) strip Check blood sugar 4x/day, Diagnosis E10.9 12/26/17   Rony Baird MD   Lancets ([facility]) Misc To use up to 100 units daily 10/16/12   Sara Goldman MD       REVIEW OF SYSTEMS:     I am not able to complete the review of systems because:    The patient is intubated and sedated    The patient has altered mental status due to his acute medical problems    The patient has baseline aphasia from prior stroke(s)    The patient has baseline dementia and is not reliable historian    The patient is in acute medical distress and unable to provide information           Total of 12 systems reviewed as follows:       POSITIVE= underlined text  Negative = text not underlined  General:  fever, chills, sweats, generalized weakness, weight loss/gain,      loss of appetite   Eyes:    blurred vision, eye pain, loss of vision, double vision  ENT:    rhinorrhea, pharyngitis   Respiratory:   cough, sputum production, SOB, STAHL, wheezing, pleuritic pain   Cardiology:   chest pain, palpitations, orthopnea, PND, edema, syncope   Gastrointestinal:  abdominal pain , N/V, diarrhea, dysphagia, constipation, bleeding   Genitourinary:  frequency, urgency, dysuria, hematuria, incontinence   Muskuloskeletal :  arthralgia, myalgia, back pain  Hematology:  easy bruising, nose or gum bleeding, lymphadenopathy   Dermatological: rash, ulceration, pruritis, color change / jaundice  Endocrine:   hot flashes or polydipsia   Neurological:  headache, dizziness, confusion, focal weakness, paresthesia,     Speech difficulties, memory loss, gait difficulty  Psychological: Feelings of anxiety, depression, agitation    Objective:   VITALS:    Visit Vitals  /81 (BP Patient Position: At rest)   Pulse (!) 125   Temp 98.7 °F (37.1 °C)   Resp 24   Ht 5' 11\" (1.803 m)   Wt 58.2 kg (128 lb 4.9 oz)   SpO2 100%   BMI 17.90 kg/m²       PHYSICAL EXAM:    General:    Alert, cooperative, no distress, appears stated age. HEENT: Atraumatic, anicteric sclerae, pink conjunctivae     No oral ulcers, mucosa moist, throat clear, dentition fair  Neck:  Supple, symmetrical,  thyroid: non tender  Lungs:   Clear to auscultation bilaterally. No Wheezing or Rhonchi. No rales. Chest wall:  No tenderness  No Accessory muscle use. Heart:   Regular  rhythm,  No  murmur   No edema  Abdomen:   Soft, non-tender. Not distended. Bowel sounds normal  Extremities: No cyanosis. No clubbing,      Skin turgor normal, Capillary refill normal, Radial dial pulse 2+  Skin:     Not pale. Not Jaundiced  No rashes   Psych:  Good insight. Not depressed. Not anxious or agitated. Neurologic: EOMs intact. No facial asymmetry. No aphasia or slurred speech. Symmetrical strength, Sensation grossly intact.  Alert and oriented X 4.     _______________________________________________________________________  Care Plan discussed with:    Comments   Patient y    Family      RN y    Care Manager                    Consultant:      _______________________________________________________________________  Expected  Disposition:   Home with Family y   HH/PT/OT/RN    SRIRAM/LTC    ADOLFO ________________________________________________________________________  TOTAL TIME:  54  Minutes    Critical Care Provided     Minutes non procedure based      Comments    y Reviewed previous records   >50% of visit spent in counseling and coordination of care y Discussion with patient and/or family and questions answered       ________________________________________________________________________  Signed: Dipak Murray MD    Procedures: see electronic medical records for all procedures/Xrays and details which were not copied into this note but were reviewed prior to creation of Plan. LAB DATA REVIEWED:    Recent Results (from the past 24 hour(s))   GLUCOSE, POC    Collection Time: 10/21/21  9:25 PM   Result Value Ref Range    Glucose (POC) 389 (H) 65 - 117 mg/dL    Performed by Thao GALE    EKG, 12 LEAD, INITIAL    Collection Time: 10/21/21  9:28 PM   Result Value Ref Range    Ventricular Rate 122 BPM    Atrial Rate 122 BPM    P-R Interval 130 ms    QRS Duration 80 ms    Q-T Interval 348 ms    QTC Calculation (Bezet) 495 ms    Calculated P Axis 80 degrees    Calculated R Axis 88 degrees    Calculated T Axis 68 degrees    Diagnosis       Sinus tachycardia  Biatrial enlargement  No previous ECGs available     CBC WITH AUTOMATED DIFF    Collection Time: 10/21/21  9:34 PM   Result Value Ref Range    WBC 13.0 (H) 4.1 - 11.1 K/uL    RBC 5.14 4.10 - 5.70 M/uL    HGB 17.5 (H) 12.1 - 17.0 g/dL    HCT 50.3 36.6 - 50.3 %    MCV 97.9 80.0 - 99.0 FL    MCH 34.0 26.0 - 34.0 PG    MCHC 34.8 30.0 - 36.5 g/dL    RDW 11.2 (L) 11.5 - 14.5 %    PLATELET 690 191 - 671 K/uL    MPV 11.0 8.9 - 12.9 FL    NRBC 0.0 0  WBC    ABSOLUTE NRBC 0.00 0.00 - 0.01 K/uL    NEUTROPHILS 74 32 - 75 %    LYMPHOCYTES 18 12 - 49 %    MONOCYTES 7 5 - 13 %    EOSINOPHILS 0 0 - 7 %    BASOPHILS 1 0 - 1 %    IMMATURE GRANULOCYTES 0 0.0 - 0.5 %    ABS. NEUTROPHILS 9.7 (H) 1.8 - 8.0 K/UL    ABS. LYMPHOCYTES 2.3 0.8 - 3.5 K/UL    ABS. MONOCYTES 0.8 0.0 - 1.0 K/UL    ABS. EOSINOPHILS 0.0 0.0 - 0.4 K/UL    ABS. BASOPHILS 0.1 0.0 - 0.1 K/UL    ABS. IMM. GRANS. 0.0 0.00 - 0.04 K/UL    DF AUTOMATED     METABOLIC PANEL, COMPREHENSIVE    Collection Time: 10/21/21  9:34 PM   Result Value Ref Range    Sodium 128 (L) 136 - 145 mmol/L    Potassium 5.2 (H) 3.5 - 5.1 mmol/L    Chloride 92 (L) 97 - 108 mmol/L    CO2 14 (LL) 21 - 32 mmol/L    Anion gap 22 (H) 5 - 15 mmol/L    Glucose 421 (H) 65 - 100 mg/dL    BUN 15 6 - 20 MG/DL    Creatinine 1.25 0.70 - 1.30 MG/DL    BUN/Creatinine ratio 12 12 - 20      GFR est AA >60 >60 ml/min/1.73m2    GFR est non-AA >60 >60 ml/min/1.73m2    Calcium 10.0 8.5 - 10.1 MG/DL    Bilirubin, total 1.0 0.2 - 1.0 MG/DL    ALT (SGPT) 14 12 - 78 U/L    AST (SGOT) 24 15 - 37 U/L    Alk. phosphatase 102 45 - 117 U/L    Protein, total 8.7 (H) 6.4 - 8.2 g/dL    Albumin 4.9 3.5 - 5.0 g/dL    Globulin 3.8 2.0 - 4.0 g/dL    A-G Ratio 1.3 1.1 - 2.2     TROPONIN-HIGH SENSITIVITY    Collection Time: 10/21/21  9:34 PM   Result Value Ref Range    Troponin-High Sensitivity 4 0 - 76 ng/L   SAMPLES BEING HELD    Collection Time: 10/21/21  9:34 PM   Result Value Ref Range    SAMPLES BEING HELD LAV,SST,RD,PST     COMMENT        Add-on orders for these samples will be processed based on acceptable specimen integrity and analyte stability, which may vary by analyte.    MAGNESIUM    Collection Time: 10/21/21  9:34 PM   Result Value Ref Range    Magnesium 2.6 (H) 1.6 - 2.4 mg/dL   CK    Collection Time: 10/21/21  9:34 PM   Result Value Ref Range    CK 96 39 - 308 U/L   COVID-19 RAPID TEST    Collection Time: 10/21/21 10:02 PM   Result Value Ref Range    Specimen source Nasopharyngeal      COVID-19 rapid test Not detected NOTD     GLUCOSE, POC    Collection Time: 10/21/21 11:16 PM   Result Value Ref Range    Glucose (POC) 396 (H) 65 - 117 mg/dL    Performed by Jojo Yi RN    GLUCOSTABILIZER    Collection Time: 10/21/21 11:20 PM   Result Value Ref Range Glucose 396 mg/dL    Insulin order 6.7 units/hour    Insulin adminstered 6.7 units/hour    Multiplier 0.020     Low target 150 mg/dL    High target 250 mg/dL    D50 order 0.0 ml    D50 administered 0.00 ml    Minutes until next BG 60 min    Order initials MB     Administered initials MB     GLSCOM Comments

## 2021-10-22 NOTE — PROGRESS NOTES
Hospitalist Progress Note    NAME: Chidi Richard   :  1995   MRN:  [id number]     Admit date: 10/21/2021    Today's date: 10/22/21    PCP: Rangel Breaux MD      Anticipated discharge date:    Barriers:      Assessment / Plan:    DKA in type I diabetes POA  N/V secondary to DKA POA  - Not in DKA for awhile  - uses an insulin pump at home, follows at [facility] now  - HgBa1c 9.6  improving on insulin gtt, but not out of DKA   BS dropped on the gtt, so held for several hours   Not clear why D5 was added to IVF  - Started feeling worse with vomiting, AG increased c/w worsening DKA while gtt was stopped  add glucose and K to IVF, now DKA improving  - Hopefully off the gtt, resume pump in AM  - Appreciate DTC input     SIRS POA with leukocytosis and tachycardia POA  Patient afebrile  UA negative  Chest x-ray negative for any acute process  Follow-up procalcitonin 0.66, but no clear source of infection   Recheck in AM, check blood culture  Check lactic acid     Code Status: Full  Surrogate Decision Maker:     DVT Prophylaxis: lovenox  GI Prophylaxis: not indicated     Body mass index is 17.9 kg/m². Subjective:     Chief Complaint / Reason for Physician Visit f/u DKA  \"I worse now\". Discussed with RN events overnight. I saw earlier today, actively vomiting  BS increasing and AG increasing after insulin gtt held for low BS x several hours earlier in Am  Aches all over    Review of Systems:  Symptom Y/N Comments  Symptom Y/N Comments   Fever/Chills n   Chest Pain n    Poor Appetite    Edema     Cough n   Abdominal Pain y    Sputum    Joint Pain     SOB/STAHL n   Headache     Nausea/vomit y   Tolerating PT/OT     Diarrhea n   Tolerating Diet n    Constipation    Other       Could NOT obtain due to:      Objective:     VITALS:   Last 24hrs VS reviewed since prior progress note.  Most recent are:  Patient Vitals for the past 24 hrs:   Temp Pulse Resp BP SpO2   10/22/21 1520 98.3 °F (36.8 °C) 90 20 116/60 98 %   10/22/21 1118 98.3 °F (36.8 °C) (!) 111 20 (!) 122/58 95 %   10/22/21 0736 98 °F (36.7 °C) 98 20 123/68 98 %   10/22/21 0249 98.4 °F (36.9 °C) (!) 103 19 117/67 98 %   10/22/21 0102 97.9 °F (36.6 °C) (!) 104 19 137/80 98 %   10/22/21 0019  (!) 104      10/22/21 0001 97.7 °F (36.5 °C) (!) 125 20 (!) 141/75 98 %   10/21/21 2345  (!) 106 21  99 %   10/21/21 2330  (!) 114 19  100 %   10/21/21 2315   16 132/75 100 %   10/21/21 2300  (!) 122 20 134/69 100 %   10/21/21 2245  (!) 123 17 128/81 99 %   10/21/21 2230  (!) 112 17 125/71 99 %   10/21/21 2215  (!) 109 20 131/69 98 %   10/21/21 2200  (!) 113 20 130/73 98 %   10/21/21 2143     100 %   10/21/21 2125 98.7 °F (37.1 °C) (!) 125 24 137/81 98 %       Intake/Output Summary (Last 24 hours) at 10/22/2021 1855  Last data filed at 10/22/2021 1612  Gross per 24 hour   Intake 1962.91 ml   Output    Net 1962.91 ml        Wt Readings from Last 12 Encounters:   10/21/21 58.2 kg (128 lb 4.9 oz)   07/02/21 64.4 kg (142 lb)   02/06/21 59 kg (130 lb)   07/01/19 61.5 kg (135 lb 8 oz)   12/26/18 59 kg (130 lb)   11/13/18 63.2 kg (139 lb 4.8 oz)   05/04/18 62.5 kg (137 lb 12.8 oz)   01/31/18 61.6 kg (135 lb 12.8 oz)   12/21/17 58.8 kg (129 lb 9.6 oz)   11/27/17 63.4 kg (139 lb 12.8 oz)   07/24/17 61.7 kg (136 lb)   07/17/17 64 kg (141 lb)       PHYSICAL EXAM:    I had a face to face encounter and independently examined this patient on 10/22/21 as outlined below:    General: WD, WN. Alert, cooperative, uncomfortable, vomiting when I saw  EENT:  PERRL. Anicteric sclerae. MMM  Resp:  CTA bilaterally, no wheezing or rales. No accessory muscle use  CV:  Regular  rhythm,  No edema  GI:  Soft, Non distended, Non tender. +Bowel sounds, no rebound  Neurologic:  Alert and oriented X 3, normal speech, non focal motor exam  Psych:   Good insight. Not anxious nor agitated  Skin:  No rashes.   No jaundice    Reviewed most current lab test results and cultures YES  Reviewed most current radiology test results   YES  Review and summation of old records today    NO  Reviewed patient's current orders and MAR    YES  PMH/SH reviewed - no change compared to H&P  ________________________________________________________________________  Care Plan discussed with:    Comments   Patient x    Family  xx Mother in room   RN x    Care Manager     Consultant                        Multidiciplinary team rounds were held today with , nursing, pharmacist and clinical coordinator. Patient's plan of care was discussed; medications were reviewed and discharge planning was addressed. ________________________________________________________________________      Comments   >50% of visit spent in counseling and coordination of care     ________________________________________________________________________  Nathalie Taveras MD     Procedures: see electronic medical records for all procedures/Xrays and details which were not copied into this note but were reviewed prior to creation of Plan. LABS:  I reviewed today's most current labs and imaging studies.   Pertinent labs include:  Recent Labs     10/21/21  2134   WBC 13.0*   HGB 17.5*   HCT 50.3        Recent Labs     10/22/21  1803 10/22/21  1350 10/22/21  0902 10/22/21  0342 10/22/21  0342 10/21/21  2259 10/21/21  2259 10/21/21  2134 10/21/21  2134    134* 132*   < > 135*   < > 130*   < > 128*   K 4.0 4.2 5.4*   < > 4.1   < > 4.9   < > 5.2*   * 106 102   < > 106   < > 96*   < > 92*   CO2 19* 11* 8*   < > 12*   < > 10*   < > 14*   * 312* 296*   < > 144*   < > 393*   < > 421*   BUN 10 11 12   < > 13   < > 15   < > 15   CREA 1.21 1.19 1.09   < > 0.96   < > 0.98   < > 1.25   CA 8.4* 8.4* 8.9   < > 8.7   < > 9.0   < > 10.0   MG 2.4 2.3 2.1   < > 2.3   < > 2.3   < > 2.6*   PHOS  --   --   --   --  2.9  --  4.1  --   --    ALB  --   --   --   --   --   --   --   --  4.9   TBILI  --   --   --   -- --   --   --   --  1.0   ALT  --   --   --   --   --   --   --   --  14    < > = values in this interval not displayed.

## 2021-10-22 NOTE — PROGRESS NOTES
9832: Bedside shift change report given to Guy Rivers RN (oncoming nurse) by Stone Stack RN (offgoing nurse). Report included the following information SBAR, Kardex, Intake/Output, MAR, Recent Results and Cardiac Rhythm Sinus Tach.     0820: PRN IV Zofran ordered for nausea per MD and given at this time. 1011: Patient vomited again. MD aware. MD ordered PRN Compazine IV Q6 PRN. 1104: Lab called with critical CO2 of 8. MD notified. 1900: End of Shift Note    Bedside shift change report given to Cortney Timmons RN (oncoming nurse) by Isaiah Luque RN (offgoing nurse). Report included the following information SBAR, Kardex, Intake/Output, MAR, Recent Results and Cardiac Rhythm Sinus tach    Shift worked:  7a-7p     Shift summary and any significant changes:    Patient refused heparin    Insulin gtt currently at 8.9mg/hr    Next BMP and Mag due at 10pm    Fluids changed to D5 NS 20meq KCL @ 200mL/hr    Diet advanced to clears   Concerns for physician to address: See above   Zone phone for oncoming shift:  [phone]     Activity:  Activity Level: Up with Assistance  Number times ambulated in hallways past shift: 0  Number of times OOB to chair past shift: 0    Cardiac:   Cardiac Monitoring: Yes      Cardiac Rhythm: Sinus Tachy    Access:   Current line(s): PIV     Genitourinary:   Urinary status: voiding    Respiratory:   O2 Device: None (Room air)  Chronic home O2 use?: NO  Incentive spirometer at bedside: NO     GI:     Current diet:  ADULT DIET Clear Liquid  Passing flatus: YES  Tolerating current diet: YES       Pain Management:   Patient states pain is manageable on current regimen: YES    Skin:  Rene Score: 22  Interventions: float heels and increase time out of bed    Patient Safety:  Fall Score:  Total Score: 1  Interventions: bed/chair alarm, gripper socks and pt to call before getting OOB       Length of Stay:  Expected LOS: - - -  Actual LOS: 1      Isaiah Luque RN

## 2021-10-22 NOTE — PROGRESS NOTES
End of Shift Note    Bedside shift change report given to Hanane Stockton RN (oncoming nurse) by Frederik Nageotte, RN (offgoing nurse). Report included the following information SBAR    Shift worked:  7p-7a     Shift summary and any significant changes:    0100: patient arrived on insulin gtt 8. 8u/hr with NS running at 100ml/hr and no complaints of nausea. VSS    Critical: CO2: 12    0545: B rate change to 0.0u/hr  0645: B rate stay at 0.0u/hr    BMP/Mag due: 0830   Concerns for physician to address:       Zone phone for oncoming shift:          Activity:  Activity Level: Up with Assistance  Number times ambulated in hallways past shift: 0  Number of times OOB to chair past shift: 0    Cardiac:   Cardiac Monitoring: Yes      Cardiac Rhythm: Sinus Tachy    Access:   Current line(s): PIV     Genitourinary:   Urinary status: voiding    Respiratory:   O2 Device: None (Room air)  Chronic home O2 use?: YES  Incentive spirometer at bedside: YES     GI:     Current diet:  DIET NPO  Passing flatus: YES  Tolerating current diet: NPO       Pain Management:   Patient states pain is manageable on current regimen: YES    Skin:  Rene Score: 22  Interventions:     Patient Safety:  Fall Score:  Total Score: 1  Interventions: bed/chair alarm, assistive device (walker, cane, etc), gripper socks, pt to call before getting OOB and stay with me (per policy)       Length of Stay:  Expected LOS: - - -  Actual LOS: 1      Frederik Nageotte, RN

## 2021-10-22 NOTE — PROGRESS NOTES
TRANSFER - IN REPORT:    Verbal report received from [address] [place] [address] Avenue, RN(name) [facility]  being received from ED(unit) for routine progression of care      Report consisted of patients Situation, Background, Assessment and   Recommendations(SBAR). Information from the following report(s) SBAR and ED Summary was reviewed with the receiving nurse. Opportunity for questions and clarification was provided. Assessment completed upon patients arrival to unit and care assumed.

## 2021-10-23 VITALS
SYSTOLIC BLOOD PRESSURE: 121 MMHG | TEMPERATURE: 98.1 F | WEIGHT: 128.31 LBS | HEART RATE: 79 BPM | OXYGEN SATURATION: 95 % | DIASTOLIC BLOOD PRESSURE: 79 MMHG | RESPIRATION RATE: 16 BRPM | HEIGHT: 71 IN | BODY MASS INDEX: 17.96 KG/M2

## 2021-10-23 LAB
ADMINISTERED INITIALS, ADMINIT: NORMAL
ANION GAP SERPL CALC-SCNC: 5 MMOL/L (ref 5–15)
BASOPHILS # BLD: 0 K/UL (ref 0–0.1)
BASOPHILS NFR BLD: 0 % (ref 0–1)
BUN SERPL-MCNC: 6 MG/DL (ref 6–20)
BUN SERPL-MCNC: 8 MG/DL (ref 6–20)
BUN SERPL-MCNC: 8 MG/DL (ref 6–20)
BUN/CREAT SERPL: 10 (ref 12–20)
BUN/CREAT SERPL: 7 (ref 12–20)
BUN/CREAT SERPL: 9 (ref 12–20)
CALCIUM SERPL-MCNC: 8.1 MG/DL (ref 8.5–10.1)
CALCIUM SERPL-MCNC: 8.2 MG/DL (ref 8.5–10.1)
CALCIUM SERPL-MCNC: 8.3 MG/DL (ref 8.5–10.1)
CHLORIDE SERPL-SCNC: 110 MMOL/L (ref 97–108)
CHLORIDE SERPL-SCNC: 112 MMOL/L (ref 97–108)
CHLORIDE SERPL-SCNC: 114 MMOL/L (ref 97–108)
CO2 SERPL-SCNC: 20 MMOL/L (ref 21–32)
CO2 SERPL-SCNC: 21 MMOL/L (ref 21–32)
CO2 SERPL-SCNC: 23 MMOL/L (ref 21–32)
CREAT SERPL-MCNC: 0.83 MG/DL (ref 0.7–1.3)
CREAT SERPL-MCNC: 0.86 MG/DL (ref 0.7–1.3)
CREAT SERPL-MCNC: 0.88 MG/DL (ref 0.7–1.3)
D50 ADMINISTERED, D50ADM: 0 ML
D50 ADMINISTERED, D50ADM: 8 ML
D50 ADMINISTERED, D50ADM: 9 ML
D50 ORDER, D50ORD: 0 ML
D50 ORDER, D50ORD: 8 ML
D50 ORDER, D50ORD: 9 ML
DIFFERENTIAL METHOD BLD: ABNORMAL
EOSINOPHIL # BLD: 0 K/UL (ref 0–0.4)
EOSINOPHIL NFR BLD: 0 % (ref 0–7)
ERYTHROCYTE [DISTWIDTH] IN BLOOD BY AUTOMATED COUNT: 11.8 % (ref 11.5–14.5)
GLSCOM COMMENTS: NORMAL
GLUCOSE BLD STRIP.AUTO-MCNC: 103 MG/DL (ref 65–117)
GLUCOSE BLD STRIP.AUTO-MCNC: 151 MG/DL (ref 65–117)
GLUCOSE BLD STRIP.AUTO-MCNC: 158 MG/DL (ref 65–117)
GLUCOSE BLD STRIP.AUTO-MCNC: 166 MG/DL (ref 65–117)
GLUCOSE BLD STRIP.AUTO-MCNC: 179 MG/DL (ref 65–117)
GLUCOSE BLD STRIP.AUTO-MCNC: 179 MG/DL (ref 65–117)
GLUCOSE BLD STRIP.AUTO-MCNC: 180 MG/DL (ref 65–117)
GLUCOSE BLD STRIP.AUTO-MCNC: 183 MG/DL (ref 65–117)
GLUCOSE BLD STRIP.AUTO-MCNC: 193 MG/DL (ref 65–117)
GLUCOSE BLD STRIP.AUTO-MCNC: 203 MG/DL (ref 65–117)
GLUCOSE BLD STRIP.AUTO-MCNC: 251 MG/DL (ref 65–117)
GLUCOSE BLD STRIP.AUTO-MCNC: 258 MG/DL (ref 65–117)
GLUCOSE BLD STRIP.AUTO-MCNC: 259 MG/DL (ref 65–117)
GLUCOSE BLD STRIP.AUTO-MCNC: 273 MG/DL (ref 65–117)
GLUCOSE BLD STRIP.AUTO-MCNC: 77 MG/DL (ref 65–117)
GLUCOSE BLD STRIP.AUTO-MCNC: 79 MG/DL (ref 65–117)
GLUCOSE SERPL-MCNC: 183 MG/DL (ref 65–100)
GLUCOSE SERPL-MCNC: 193 MG/DL (ref 65–100)
GLUCOSE SERPL-MCNC: 210 MG/DL (ref 65–100)
GLUCOSE, GLC: 103 MG/DL
GLUCOSE, GLC: 151 MG/DL
GLUCOSE, GLC: 166 MG/DL
GLUCOSE, GLC: 179 MG/DL
GLUCOSE, GLC: 179 MG/DL
GLUCOSE, GLC: 180 MG/DL
GLUCOSE, GLC: 183 MG/DL
GLUCOSE, GLC: 187 MG/DL
GLUCOSE, GLC: 193 MG/DL
GLUCOSE, GLC: 203 MG/DL
GLUCOSE, GLC: 251 MG/DL
GLUCOSE, GLC: 258 MG/DL
GLUCOSE, GLC: 273 MG/DL
GLUCOSE, GLC: 77 MG/DL
GLUCOSE, GLC: 79 MG/DL
HCT VFR BLD AUTO: 36.4 % (ref 36.6–50.3)
HGB BLD-MCNC: 13.1 G/DL (ref 12.1–17)
HIGH TARGET, HITG: 250 MG/DL
IMM GRANULOCYTES # BLD AUTO: 0 K/UL (ref 0–0.04)
IMM GRANULOCYTES NFR BLD AUTO: 0 % (ref 0–0.5)
INSULIN ADMINSTERED, INSADM: 0 UNITS/HOUR
INSULIN ADMINSTERED, INSADM: 0 UNITS/HOUR
INSULIN ADMINSTERED, INSADM: 0.8 UNITS/HOUR
INSULIN ADMINSTERED, INSADM: 1.9 UNITS/HOUR
INSULIN ADMINSTERED, INSADM: 2.1 UNITS/HOUR
INSULIN ADMINSTERED, INSADM: 2.2 UNITS/HOUR
INSULIN ADMINSTERED, INSADM: 2.3 UNITS/HOUR
INSULIN ADMINSTERED, INSADM: 2.4 UNITS/HOUR
INSULIN ADMINSTERED, INSADM: 2.6 UNITS/HOUR
INSULIN ADMINSTERED, INSADM: 3.5 UNITS/HOUR
INSULIN ADMINSTERED, INSADM: 3.6 UNITS/HOUR
INSULIN ADMINSTERED, INSADM: 4.5 UNITS/HOUR
INSULIN ADMINSTERED, INSADM: 4.6 UNITS/HOUR
INSULIN ADMINSTERED, INSADM: 6 UNITS/HOUR
INSULIN ADMINSTERED, INSADM: 7.3 UNITS/HOUR
INSULIN ORDER, INSORD: 0 UNITS/HOUR
INSULIN ORDER, INSORD: 0 UNITS/HOUR
INSULIN ORDER, INSORD: 0.8 UNITS/HOUR
INSULIN ORDER, INSORD: 1.9 UNITS/HOUR
INSULIN ORDER, INSORD: 2.1 UNITS/HOUR
INSULIN ORDER, INSORD: 2.2 UNITS/HOUR
INSULIN ORDER, INSORD: 2.3 UNITS/HOUR
INSULIN ORDER, INSORD: 2.4 UNITS/HOUR
INSULIN ORDER, INSORD: 2.6 UNITS/HOUR
INSULIN ORDER, INSORD: 3.5 UNITS/HOUR
INSULIN ORDER, INSORD: 3.6 UNITS/HOUR
INSULIN ORDER, INSORD: 4.5 UNITS/HOUR
INSULIN ORDER, INSORD: 4.6 UNITS/HOUR
INSULIN ORDER, INSORD: 6 UNITS/HOUR
INSULIN ORDER, INSORD: 7.3 UNITS/HOUR
LOW TARGET, LOT: 150 MG/DL
LYMPHOCYTES # BLD: 1.6 K/UL (ref 0.8–3.5)
LYMPHOCYTES NFR BLD: 16 % (ref 12–49)
MAGNESIUM SERPL-MCNC: 2.1 MG/DL (ref 1.6–2.4)
MAGNESIUM SERPL-MCNC: 2.2 MG/DL (ref 1.6–2.4)
MAGNESIUM SERPL-MCNC: 2.3 MG/DL (ref 1.6–2.4)
MCH RBC QN AUTO: 33.5 PG (ref 26–34)
MCHC RBC AUTO-ENTMCNC: 36 G/DL (ref 30–36.5)
MCV RBC AUTO: 93.1 FL (ref 80–99)
MINUTES UNTIL NEXT BG, NBG: 120 MIN
MINUTES UNTIL NEXT BG, NBG: 15 MIN
MINUTES UNTIL NEXT BG, NBG: 15 MIN
MINUTES UNTIL NEXT BG, NBG: 60 MIN
MONOCYTES # BLD: 0.9 K/UL (ref 0–1)
MONOCYTES NFR BLD: 9 % (ref 5–13)
MULTIPLIER, MUL: 0.02
MULTIPLIER, MUL: 0.03
MULTIPLIER, MUL: 0.03
MULTIPLIER, MUL: 0.04
NEUTS SEG # BLD: 7.3 K/UL (ref 1.8–8)
NEUTS SEG NFR BLD: 74 % (ref 32–75)
NRBC # BLD: 0 K/UL (ref 0–0.01)
NRBC BLD-RTO: 0 PER 100 WBC
ORDER INITIALS, ORDINIT: NORMAL
PLATELET # BLD AUTO: 151 K/UL (ref 150–400)
PMV BLD AUTO: 10.6 FL (ref 8.9–12.9)
POTASSIUM SERPL-SCNC: 3.3 MMOL/L (ref 3.5–5.1)
POTASSIUM SERPL-SCNC: 3.5 MMOL/L (ref 3.5–5.1)
POTASSIUM SERPL-SCNC: 3.7 MMOL/L (ref 3.5–5.1)
PROCALCITONIN SERPL-MCNC: 0.64 NG/ML
RBC # BLD AUTO: 3.91 M/UL (ref 4.1–5.7)
SERVICE CMNT-IMP: ABNORMAL
SERVICE CMNT-IMP: NORMAL
SODIUM SERPL-SCNC: 138 MMOL/L (ref 136–145)
SODIUM SERPL-SCNC: 138 MMOL/L (ref 136–145)
SODIUM SERPL-SCNC: 139 MMOL/L (ref 136–145)
WBC # BLD AUTO: 9.9 K/UL (ref 4.1–11.1)

## 2021-10-23 PROCEDURE — 80048 BASIC METABOLIC PNL TOTAL CA: CPT

## 2021-10-23 PROCEDURE — 36415 COLL VENOUS BLD VENIPUNCTURE: CPT

## 2021-10-23 PROCEDURE — 74011250637 HC RX REV CODE- 250/637: Performed by: NURSE PRACTITIONER

## 2021-10-23 PROCEDURE — 74011000250 HC RX REV CODE- 250: Performed by: STUDENT IN AN ORGANIZED HEALTH CARE EDUCATION/TRAINING PROGRAM

## 2021-10-23 PROCEDURE — 82962 GLUCOSE BLOOD TEST: CPT

## 2021-10-23 PROCEDURE — 74011000258 HC RX REV CODE- 258: Performed by: INTERNAL MEDICINE

## 2021-10-23 PROCEDURE — 74011250637 HC RX REV CODE- 250/637: Performed by: INTERNAL MEDICINE

## 2021-10-23 PROCEDURE — 83735 ASSAY OF MAGNESIUM: CPT

## 2021-10-23 PROCEDURE — 74011636637 HC RX REV CODE- 636/637: Performed by: INTERNAL MEDICINE

## 2021-10-23 PROCEDURE — 74011250636 HC RX REV CODE- 250/636: Performed by: INTERNAL MEDICINE

## 2021-10-23 PROCEDURE — 85025 COMPLETE CBC W/AUTO DIFF WBC: CPT

## 2021-10-23 PROCEDURE — 84145 PROCALCITONIN (PCT): CPT

## 2021-10-23 RX ORDER — POTASSIUM CHLORIDE 20 MEQ/1
40 TABLET, EXTENDED RELEASE ORAL
Status: COMPLETED | OUTPATIENT
Start: 2021-10-23 | End: 2021-10-23

## 2021-10-23 RX ORDER — INSULIN ASPART 100 [IU]/ML
INJECTION, SOLUTION INTRAVENOUS; SUBCUTANEOUS
Qty: 90 ML | Refills: 4 | Status: SHIPPED | OUTPATIENT
Start: 2021-10-23

## 2021-10-23 RX ORDER — POTASSIUM CHLORIDE 20 MEQ/1
40 TABLET, EXTENDED RELEASE ORAL
Status: DISCONTINUED | OUTPATIENT
Start: 2021-10-23 | End: 2021-10-23

## 2021-10-23 RX ADMIN — POTASSIUM CHLORIDE, DEXTROSE MONOHYDRATE AND SODIUM CHLORIDE 200 ML/HR: 150; 5; 900 INJECTION, SOLUTION INTRAVENOUS at 01:47

## 2021-10-23 RX ADMIN — SODIUM CHLORIDE 4.6 UNITS/HR: 9 INJECTION, SOLUTION INTRAVENOUS at 12:33

## 2021-10-23 RX ADMIN — POTASSIUM CHLORIDE, DEXTROSE MONOHYDRATE AND SODIUM CHLORIDE 200 ML/HR: 150; 5; 900 INJECTION, SOLUTION INTRAVENOUS at 05:47

## 2021-10-23 RX ADMIN — POTASSIUM CHLORIDE 40 MEQ: 20 TABLET, EXTENDED RELEASE ORAL at 18:19

## 2021-10-23 RX ADMIN — SODIUM CHLORIDE 3.6 UNITS/HR: 9 INJECTION, SOLUTION INTRAVENOUS at 09:05

## 2021-10-23 RX ADMIN — POTASSIUM CHLORIDE, DEXTROSE MONOHYDRATE AND SODIUM CHLORIDE 200 ML/HR: 150; 5; 900 INJECTION, SOLUTION INTRAVENOUS at 11:08

## 2021-10-23 RX ADMIN — POTASSIUM BICARBONATE 40 MEQ: 782 TABLET, EFFERVESCENT ORAL at 11:00

## 2021-10-23 RX ADMIN — INSULIN LISPRO 3 UNITS: 100 INJECTION, SOLUTION INTRAVENOUS; SUBCUTANEOUS at 11:01

## 2021-10-23 RX ADMIN — DEXTROSE MONOHYDRATE 4.5 G: 25 INJECTION, SOLUTION INTRAVENOUS at 00:33

## 2021-10-23 NOTE — PROGRESS NOTES
1300  Patient started home insulin pump per Dr. Keek Guerrero. Verbal orders to stop drip 2 hours post starting pump. 1500  Stopped insulin drip. . Settings on pump    Basal rate 1.3 units per hour of Novalog. Patient will do bolus when dinner arrives based on carbs.

## 2021-10-23 NOTE — PROGRESS NOTES
End of Shift Note    Bedside shift change report given to Gina (oncoming nurse) by Mildred Diaz RN (offgoing nurse). Report included the following information SBAR, Kardex, Intake/Output, MAR and Recent Results    Shift worked:  7p-7a     Shift summary and any significant changes:     Potassium is 3.3 this AM. Notified NP. Awaiting orders. Concerns for physician to address:       Zone phone for oncoming shift:          Activity:  Activity Level: Up with Assistance  Number times ambulated in hallways past shift: 0  Number of times OOB to chair past shift: 0    Cardiac:   Cardiac Monitoring: Yes      Cardiac Rhythm: Sinus Rhythm    Access:   Current line(s): PIV     Genitourinary:   Urinary status: voiding    Respiratory:   O2 Device: None (Room air)  Chronic home O2 use?: NO  Incentive spirometer at bedside: NO     GI:     Current diet:  ADULT DIET Clear Liquid  Passing flatus: YES  Tolerating current diet: YES       Pain Management:   Patient states pain is manageable on current regimen: YES    Skin:  Rene Score: 22  Interventions: increase time out of bed    Patient Safety:  Fall Score:  Total Score: 1  Interventions: gripper socks and pt to call before getting OOB       Length of Stay:  Expected LOS: - - -  Actual LOS: [address], RN

## 2021-10-23 NOTE — PROGRESS NOTES
1900: Report received from [facility]. Assumed care of pt. Insulin gtt running at 8. 9units/hr. 2025: . Insulin gtt rate changed to 7.1 units/hr per glucostabilizer. Confirmed with Author Merritt RN.     2115: . Insulin gtt rate changed to 6.3 units/hr. BG not due again until 2315 per gluco stabilizer. Confirmed with EBONY Hamilton.     2215: Q4 BMP and mag drawn and sent down to lab.     2310: . Insulin rate change to 2. 4units/hr per gluco-stabilizer. Next BG check due at [address] [address]. Confirmed with Dayanara Campuzano RN.     2300: Report given to Author Merritt RN.

## 2021-10-23 NOTE — PROGRESS NOTES
Hospitalist Progress Note    NAME: Eugenie Warner   :  1995   MRN:  [id number]     Admit date: 10/21/2021    Today's date: 10/23/21    PCP: Delmis Trotter MD      Anticipated discharge date:10/23    Barriers:      Assessment / Plan:    DKA in type I diabetes POA  N/V secondary to DKA POA  - Not in DKA for awhile  - uses an insulin pump at home, follows at [facility] now  - HgBa1c 9.6  - Initial improved on insulin gtt, but not out of DKA, then worsened yesterday 10/22   BS dropped on the gtt yesterday AM, so held for several hours   Started feeling worse with vomiting,    AG increased c/w worsening DKA while gtt was stopped  - D5 added to IVF, insulin gtt resumed, now DKA resolved  - Transition to insulin pump, spoke with Endocrinology, resume prior settings  - If BS stable off gtt, discharge to home this afternoon  - Appreciate DTC input  - Spoke with mother at bedside     SIRS POA with leukocytosis and tachycardia POA due to DKA  Patient afebrile, leukocytosis resolved off antibiotics  UA negative  Chest x-ray negative for any acute process   Procalcitonin 0.66, but no clear source of infection   Clinically resolved, SIRS resolved off antibiotics     Code Status: Full  Surrogate Decision Maker:     DVT Prophylaxis: lovenox  GI Prophylaxis: not indicated     Body mass index is 17.9 kg/m². Subjective:     Chief Complaint / Reason for Physician Visit f/u DKA  \"I feel back to normal\". Discussed with RN events overnight. Feels back to normal, no complaints  Tolerating diet, no N/V    Review of Systems:  Symptom Y/N Comments  Symptom Y/N Comments   Fever/Chills n   Chest Pain n    Poor Appetite    Edema     Cough n   Abdominal Pain n    Sputum    Joint Pain     SOB/STAHL n   Headache     Nausea/vomit n   Tolerating PT/OT     Diarrhea n   Tolerating Diet n    Constipation    Other       Could NOT obtain due to:      Objective:     VITALS:   Last 24hrs VS reviewed since prior progress note. Most recent are:  Patient Vitals for the past 24 hrs:   Temp Pulse Resp BP SpO2   10/23/21 1424 98.1 °F (36.7 °C) 79 16 121/79 95 %   10/23/21 1034 98 °F (36.7 °C) 71 16 112/67 98 %   10/23/21 0738 97.7 °F (36.5 °C) 70 16 108/62 96 %   10/23/21 0443 98.2 °F (36.8 °C) 79 18 110/65 97 %   10/22/21 2304 98 °F (36.7 °C) 78 16 (!) 96/50 100 %   10/22/21 1932 98.5 °F (36.9 °C) 83 16 114/62 97 %       Intake/Output Summary (Last 24 hours) at 10/23/2021 1542  Last data filed at 10/23/2021 0253  Gross per 24 hour   Intake 3106.66 ml   Output    Net 3106.66 ml        Wt Readings from Last 12 Encounters:   10/21/21 58.2 kg (128 lb 4.9 oz)   07/02/21 64.4 kg (142 lb)   02/06/21 59 kg (130 lb)   07/01/19 61.5 kg (135 lb 8 oz)   12/26/18 59 kg (130 lb)   11/13/18 63.2 kg (139 lb 4.8 oz)   05/04/18 62.5 kg (137 lb 12.8 oz)   01/31/18 61.6 kg (135 lb 12.8 oz)   12/21/17 58.8 kg (129 lb 9.6 oz)   11/27/17 63.4 kg (139 lb 12.8 oz)   07/24/17 61.7 kg (136 lb)   07/17/17 64 kg (141 lb)       PHYSICAL EXAM:    I had a face to face encounter and independently examined this patient on 10/23/21 as outlined below:    General: WD, WN. Alert, cooperative, uncomfortable, vomiting when I saw  EENT:  PERRL. Anicteric sclerae. MMM  Resp:  CTA bilaterally, no wheezing or rales. No accessory muscle use  CV:  Regular  rhythm,  No edema  GI:  Soft, Non distended, Non tender. +Bowel sounds, no rebound  Neurologic:  Alert and oriented X 3, normal speech, non focal motor exam  Psych:   Good insight. Not anxious nor agitated  Skin:  No rashes.   No jaundice    Reviewed most current lab test results and cultures  YES  Reviewed most current radiology test results   YES  Review and summation of old records today    NO  Reviewed patient's current orders and MAR    YES  PMH/SH reviewed - no change compared to H&P  ________________________________________________________________________  Care Plan discussed with:    Comments   Patient x    Family  xx Mother in room   RN x    Care Manager     Consultant                        Multidiciplinary team rounds were held today with , nursing, pharmacist and clinical coordinator. Patient's plan of care was discussed; medications were reviewed and discharge planning was addressed. ________________________________________________________________________      Comments   >50% of visit spent in counseling and coordination of care     ________________________________________________________________________  Darin Lugo MD     Procedures: see electronic medical records for all procedures/Xrays and details which were not copied into this note but were reviewed prior to creation of Plan. LABS:  I reviewed today's most current labs and imaging studies. Pertinent labs include:  Recent Labs     10/23/21  0143 10/21/21  2134   WBC 9.9 13.0*   HGB 13.1 17.5*   HCT 36.4* 50.3    226     Recent Labs     10/23/21  1134 10/23/21  0549 10/23/21  0143 10/22/21  0902 10/22/21  0342 10/21/21  2259 10/21/21  2259 10/21/21  2134 10/21/21  2134    139 138   < > 135*   < > 130*   < > 128*   K 3.5 3.3* 3.7   < > 4.1   < > 4.9   < > 5.2*   * 114* 112*   < > 106   < > 96*   < > 92*   CO2 23 20* 21   < > 12*   < > 10*   < > 14*   * 193* 183*   < > 144*   < > 393*   < > 421*   BUN 6 8 8   < > 13   < > 15   < > 15   CREA 0.86 0.83 0.88   < > 0.96   < > 0.98   < > 1.25   CA 8.3* 8.1* 8.2*   < > 8.7   < > 9.0   < > 10.0   MG 2.1 2.3 2.2   < > 2.3   < > 2.3   < > 2.6*   PHOS  --   --   --   --  2.9  --  4.1  --   --    ALB  --   --   --   --   --   --   --   --  4.9   TBILI  --   --   --   --   --   --   --   --  1.0   ALT  --   --   --   --   --   --   --   --  14    < > = values in this interval not displayed.

## 2021-10-23 NOTE — DISCHARGE INSTRUCTIONS
Patient Discharge Instructions    Cheryl Trivedi / [id number] : 1995    Admitted 10/21/2021 Discharged: 10/23/2021         DISCHARGE DIAGNOSIS:   Active Problems:    [facility])           What to do at Home    1. Recommended diet: Diabetic Diet    2. Recommended activity: Activity as tolerated    3. If you experience any of the following symptoms then please call your primary care physician or return to the emergency room if you cannot get hold of your doctor:   Fevers > 100.5, chills   Nausea or vomiting, persistent diarrhea > 24 hours   Blood in stool or black stools   Chest pain or SOB      Follow-up Information     Follow up With Specialties Details Why Contact Info    Raissa Spangler MD Pediatric Medicine Schedule an appointment as soon as possible for a visit As needed [address] [facility] [address] [facility]  Schedule an appointment as soon as possible for a visit  [address]        Call [facility] to set up appointment, if it takes longer than 2 weeks, would follow up at [address] [place] [address] until you get established            Information obtained by :  I understand that if any problems occur once I am at home I am to contact my physician. I understand and acknowledge receipt of the instructions indicated above.                                                                                                                                            Physician's or R.N.'s Signature                                                                  Date/Time                                                                                                                                              Patient or Representative Signature                                                          Date/Time

## 2021-10-23 NOTE — PROGRESS NOTES
Problem: Falls - Risk of  Goal: *Absence of Falls  Description: Document Cesario Shah Fall Risk and appropriate interventions in the flowsheet. Outcome: Progressing Towards Goal  Note: Fall Risk Interventions:            Medication Interventions: Bed/chair exit alarm, Patient to call before getting OOB, Teach patient to arise slowly                   Problem: Patient Education: Go to Patient Education Activity  Goal: Patient/Family Education  Outcome: Progressing Towards Goal     Problem: Diabetes Self-Management  Goal: *Disease process and treatment process  Description: Define diabetes and identify own type of diabetes; list 3 options for treating diabetes. Outcome: Progressing Towards Goal  Goal: *Incorporating nutritional management into lifestyle  Description: Describe effect of type, amount and timing of food on blood glucose; list 3 methods for planning meals. Outcome: Progressing Towards Goal  Goal: *Incorporating physical activity into lifestyle  Description: State effect of exercise on blood glucose levels. Outcome: Progressing Towards Goal  Goal: *Developing strategies to promote health/change behavior  Description: Define the ABC's of diabetes; identify appropriate screenings, schedule and personal plan for screenings. Outcome: Progressing Towards Goal  Goal: *Using medications safely  Description: State effect of diabetes medications on diabetes; name diabetes medication taking, action and side effects. Outcome: Progressing Towards Goal  Goal: *Monitoring blood glucose, interpreting and using results  Description: Identify recommended blood glucose targets  and personal targets. Outcome: Progressing Towards Goal  Goal: *Prevention, detection, treatment of acute complications  Description: List symptoms of hyper- and hypoglycemia; describe how to treat low blood sugar and actions for lowering  high blood glucose level.   Outcome: Progressing Towards Goal  Goal: *Prevention, detection and treatment of chronic complications  Description: Define the natural course of diabetes and describe the relationship of blood glucose levels to long term complications of diabetes.   Outcome: Progressing Towards Goal  Goal: *Developing strategies to address psychosocial issues  Description: Describe feelings about living with diabetes; identify support needed and support network  Outcome: Progressing Towards Goal  Goal: *Insulin pump training  Outcome: Progressing Towards Goal  Goal: *Sick day guidelines  Outcome: Progressing Towards Goal  Goal: *Patient Specific Goal (EDIT GOAL, INSERT TEXT)  Outcome: Progressing Towards Goal     Problem: Patient Education: Go to Patient Education Activity  Goal: Patient/Family Education  Outcome: Progressing Towards Goal

## 2021-10-23 NOTE — PROGRESS NOTES
DISCHARGE SUMMARY FROM [facility] NURSE    The patient is stable for discharge. I have reviewed the discharge instructions with the patient. The patient verbalized understanding. All questions were fully answered. The patient verbalized no complaints. Hard scripts and medication handouts were given and reviewed with the patient. Appropriate educational materials and medication side effects teaching were provided also provided. Cardiac monitor and IV line(s) were removed. The following personal items collected during admission were returned to the patient/family  Home medications: insulin pump w/ home insulin  Dental Appliance: Dental Appliances: None  Vision: Visual Aid: Glasses, With patient  Hearing Aid:    Jewelry: Jewelry: Bracelet  Clothing: Clothing: Pants, Socks, Footwear, Jacket/Coat, Shirt, Undergarments  Other Valuables: Other Valuables: Cell Phone  Valuables sent to safe: There were no personal belongings, valuables or home medications left at patient's bedside,  or safe.

## 2021-10-23 NOTE — PROGRESS NOTES
Problem: Falls - Risk of  Goal: *Absence of Falls  Description: Document Arin Light Fall Risk and appropriate interventions in the flowsheet. Outcome: Resolved/Not Met     Problem: Patient Education: Go to Patient Education Activity  Goal: Patient/Family Education  Outcome: Resolved/Not Met     Problem: Diabetes Self-Management  Goal: *Disease process and treatment process  Description: Define diabetes and identify own type of diabetes; list 3 options for treating diabetes. Outcome: Resolved/Not Met  Goal: *Incorporating nutritional management into lifestyle  Description: Describe effect of type, amount and timing of food on blood glucose; list 3 methods for planning meals. Outcome: Resolved/Not Met  Goal: *Incorporating physical activity into lifestyle  Description: State effect of exercise on blood glucose levels. Outcome: Resolved/Not Met  Goal: *Developing strategies to promote health/change behavior  Description: Define the ABC's of diabetes; identify appropriate screenings, schedule and personal plan for screenings. Outcome: Resolved/Not Met  Goal: *Using medications safely  Description: State effect of diabetes medications on diabetes; name diabetes medication taking, action and side effects. Outcome: Resolved/Not Met  Goal: *Monitoring blood glucose, interpreting and using results  Description: Identify recommended blood glucose targets  and personal targets. Outcome: Resolved/Not Met  Goal: *Prevention, detection, treatment of acute complications  Description: List symptoms of hyper- and hypoglycemia; describe how to treat low blood sugar and actions for lowering  high blood glucose level. Outcome: Resolved/Not Met  Goal: *Prevention, detection and treatment of chronic complications  Description: Define the natural course of diabetes and describe the relationship of blood glucose levels to long term complications of diabetes.   Outcome: Resolved/Not Met  Goal: *Developing strategies to address psychosocial issues  Description: Describe feelings about living with diabetes; identify support needed and support network  Outcome: Resolved/Not Met  Goal: *Insulin pump training  Outcome: Resolved/Not Met  Goal: *Sick day guidelines  Outcome: Resolved/Not Met  Goal: *Patient Specific Goal (EDIT GOAL, INSERT TEXT)  Outcome: Resolved/Not Met     Problem: Patient Education: Go to Patient Education Activity  Goal: Patient/Family Education  Outcome: Resolved/Not Met

## 2021-10-23 NOTE — PROGRESS NOTES
Received message from patient's nurse stating:    Potassium 3.3         Discussion / orders:    Effer-K 40 mEq p.o. x1         Please note that this note was dictated using Dragon computer voice recognition software. Quite often unanticipated grammatical, syntax, homophones, and other interpretive errors are inadvertently transcribed by the computer software. Please disregard these errors. Please excuse any errors that have escaped final proofreading.

## 2021-10-23 NOTE — DISCHARGE SUMMARY
Hospitalist Discharge Note    NAME: Ruth Moreno   :  1995   MRN:  [id number]     Admit date: 10/21/2021    Discharge date: 10/23/21    PCP: Esequiel Lacey MD    Discharge Diagnoses:    DKA in type I diabetes POA    N/V secondary to DKA POA     SIRS POA with leukocytosis and tachycardia POA due to DKA    Code Status: Full    Discharge Medications:  Current Discharge Medication List      CONTINUE these medications which have CHANGED    Details   insulin aspart U-100 (NovoLOG U-100 Insulin aspart) 100 unit/mL injection USE AS DIRECTED INFUSE VIA INSULIN PUMP. MAX DOSE 100 UNITS PER DAY  Qty: 90 mL, Refills: 4         CONTINUE these medications which have NOT CHANGED    Details   Blood-Glucose Meter (ONETOUCH ULTRA2) monitoring kit Use to test blood sugars 4 times per day.  DX Code: E10.9  Qty: 1 Kit, Refills: 0      glucose blood VI test strips (ASCENSIA AUTODISC VI, ONE TOUCH ULTRA TEST VI) strip Check blood sugar 4x/day, Diagnosis E10.9  Qty: 400 Strip, Refills: 3      Lancets (ONE TOUCH DELICA) Misc To use up to 100 units daily  Qty: 1 Package, Refills: 11             Follow-up Information     Follow up With Specialties Details Why Contact Info    Esequiel Lacey MD Pediatric Medicine Schedule an appointment as soon as possible for a visit As needed [address] [facility]  Schedule an appointment as soon as possible for a visit  [address] [phone]          Time spent on discharge:   I spent greater than 30 minutes on discharge, seeing and examining the patient, reconciling home meds and new meds, coordinating care with case management, doing the discharge papers and the D/C summary    Discharge disposition: home    Discharge Condition: Stable    Summary of admission H+P(copied from Dr Ja Lainez Note):     CHIEF COMPLAINT: nausea and vomiting      HISTORY OF PRESENT ILLNESS:     22-year old male from home with past medical history significant for insulin-dependent diabetes presented to the hospital for evaluation of intractable nausea and vomiting associated with epigastric pain, work-up in ED was significant for elevated blood sugar, anion gap was found to be 22, UA was positive for ketones, patient was started on insulin drip.     We were asked to admit for work up and evaluation of the above problems.           Past Medical History:   Diagnosis Date    DM type 1 (diabetes mellitus, type 1) ([address]      Vitamin D deficiency        Admit pCXR read by radiology FINDINGS:   The cardiomediastinal contours are within normal limits. The lungs and  pleural spaces are clear. There is no pneumothorax. The bones and upper abdomen  are unremarkable. IMPRESSION  No acute process.      Hospital course:     DKA in type I diabetes POA  N/V secondary to DKA POA  - Not in DKA for awhile  - uses an insulin pump at home, follows at [facility] now  - HgBa1c 9.6  - Initial improved on insulin gtt, but not out of DKA, then worsened yesterday 10/22   BS dropped on the gtt yesterday AM, so held for several hours   Started feeling worse with vomiting,    AG increased c/w worsening DKA while gtt was stopped  - D5 added to IVF, insulin gtt resumed, now DKA resolved  - Transition to insulin pump, spoke with Endocrinology, resume prior settings  - D/C to home  - Appreciate DTC input  - Spoke with mother at bedside     SIRS POA with leukocytosis and tachycardia POA due to DKA  -Patient afebrile, leukocytosis resolved off antibiotics  -UA negative  -Chest x-ray negative for any acute process  - Procalcitonin 0.66, but no clear source of infection   Clinically resolved, SIRS resolved off antibiotics  - held antibiotics     Code Status: Full  Surrogate Decision Maker:     DVT Prophylaxis: lovenox  GI Prophylaxis: not indicated     Body mass index is 17.9 kg/m². Subjective:     Chief Complaint / Reason for Physician Visit f/u DKA  \"I feel back to normal\". Discussed with RN events overnight. Feels back to normal, no complaints  Tolerating diet, no N/V    Review of Systems:  Symptom Y/N Comments  Symptom Y/N Comments   Fever/Chills n   Chest Pain n    Poor Appetite    Edema     Cough n   Abdominal Pain n    Sputum    Joint Pain     SOB/STAHL n   Headache     Nausea/vomit n   Tolerating PT/OT     Diarrhea n   Tolerating Diet n    Constipation    Other       Could NOT obtain due to:      Objective:     VITALS:   Last 24hrs VS reviewed since prior progress note. Most recent are:  Patient Vitals for the past 24 hrs:   Temp Pulse Resp BP SpO2   10/23/21 1424 98.1 °F (36.7 °C) 79 16 121/79 95 %   10/23/21 1034 98 °F (36.7 °C) 71 16 112/67 98 %   10/23/21 0738 97.7 °F (36.5 °C) 70 16 108/62 96 %   10/23/21 0443 98.2 °F (36.8 °C) 79 18 110/65 97 %   10/22/21 2304 98 °F (36.7 °C) 78 16 (!) 96/50 100 %   10/22/21 1932 98.5 °F (36.9 °C) 83 16 114/62 97 %       Intake/Output Summary (Last 24 hours) at 10/23/2021 1923  Last data filed at 10/23/2021 0253  Gross per 24 hour   Intake 2986.66 ml   Output --   Net 2986.66 ml        Wt Readings from Last 12 Encounters:   10/21/21 58.2 kg (128 lb 4.9 oz)   07/02/21 64.4 kg (142 lb)   02/06/21 59 kg (130 lb)   07/01/19 61.5 kg (135 lb 8 oz)   12/26/18 59 kg (130 lb)   11/13/18 63.2 kg (139 lb 4.8 oz)   05/04/18 62.5 kg (137 lb 12.8 oz)   01/31/18 61.6 kg (135 lb 12.8 oz)   12/21/17 58.8 kg (129 lb 9.6 oz)   11/27/17 63.4 kg (139 lb 12.8 oz)   07/24/17 61.7 kg (136 lb)   07/17/17 64 kg (141 lb)       PHYSICAL EXAM:    I had a face to face encounter and independently examined this patient on 10/23/21 as outlined below:    General: WD, WN. Alert, cooperative, uncomfortable, vomiting when I saw  EENT:  PERRL. Anicteric sclerae. MMM  Resp:  CTA bilaterally, no wheezing or rales.   No accessory muscle use  CV:  Regular  rhythm,  No edema  GI:  Soft, Non distended, Non tender. +Bowel sounds, no rebound  Neurologic:  Alert and oriented X 3, normal speech, non focal motor exam  Psych:   Good insight. Not anxious nor agitated  Skin:  No rashes. No jaundice    Reviewed most current lab test results and cultures  YES  Reviewed most current radiology test results   YES  Review and summation of old records today    NO  Reviewed patient's current orders and MAR    YES  PMH/SH reviewed - no change compared to H&P  ________________________________________________________________________  Care Plan discussed with:    Comments   Patient x    Family  xx Mother in room   RN x    Care Manager     Consultant                        Multidiciplinary team rounds were held today with , nursing, pharmacist and clinical coordinator. Patient's plan of care was discussed; medications were reviewed and discharge planning was addressed. ________________________________________________________________________      Comments   >50% of visit spent in counseling and coordination of care     ________________________________________________________________________  Christ Thornton MD     Procedures: see electronic medical records for all procedures/Xrays and details which were not copied into this note but were reviewed prior to creation of Plan. LABS:  I reviewed today's most current labs and imaging studies.   Pertinent labs include:  Recent Labs     10/23/21  0143 10/21/21  2134   WBC 9.9 13.0*   HGB 13.1 17.5*   HCT 36.4* 50.3    226     Recent Labs     10/23/21  1134 10/23/21  0549 10/23/21  0143 10/22/21  0902 10/22/21  0342 10/21/21  2259 10/21/21  2259 10/21/21  2134 10/21/21  2134    139 138   < > 135*   < > 130*   < > 128*   K 3.5 3.3* 3.7   < > 4.1   < > 4.9   < > 5.2*   * 114* 112*   < > 106   < > 96*   < > 92*   CO2 23 20* 21   < > 12*   < > 10*   < > 14*   * 193* 183*   < > 144*   < > 393*   < > 421*   BUN 6 8 8   < > 13   < > 15   < > 15   CREA 0.86 0.83 0.88   < > 0.96   < > 0.98   < > 1.25   CA 8.3* 8.1* 8.2*   < > 8.7   < > 9.0   < > 10.0   MG 2.1 2.3 2.2   < > 2.3   < > 2.3   < > 2.6*   PHOS  --   --   --   --  2.9  --  4.1  --   --    ALB  --   --   --   --   --   --   --   --  4.9   TBILI  --   --   --   --   --   --   --   --  1.0   ALT  --   --   --   --   --   --   --   --  14    < > = values in this interval not displayed.

## 2021-10-25 LAB
GLUCOSE BLD STRIP.AUTO-MCNC: 187 MG/DL (ref 65–117)
SERVICE CMNT-IMP: ABNORMAL

## 2021-12-02 ENCOUNTER — OFFICE VISIT (OUTPATIENT)
Dept: URGENT CARE | Age: 26
End: 2021-12-02

## 2021-12-02 VITALS
DIASTOLIC BLOOD PRESSURE: 72 MMHG | SYSTOLIC BLOOD PRESSURE: 118 MMHG | OXYGEN SATURATION: 97 % | RESPIRATION RATE: 18 BRPM | TEMPERATURE: 98.5 F | HEART RATE: 75 BPM

## 2021-12-02 DIAGNOSIS — L03.012 PARONYCHIA OF LEFT MIDDLE FINGER: Primary | ICD-10-CM

## 2021-12-02 PROCEDURE — 99213 OFFICE O/P EST LOW 20 MIN: CPT | Performed by: NURSE PRACTITIONER

## 2021-12-02 RX ORDER — GLUCOSAMINE SULFATE 1500 MG
POWDER IN PACKET (EA) ORAL
COMMUNITY
Start: 2021-03-29 | End: 2022-04-08

## 2021-12-02 RX ORDER — AMOXICILLIN AND CLAVULANATE POTASSIUM 875; 125 MG/1; MG/1
1 TABLET, FILM COATED ORAL 2 TIMES DAILY
Qty: 20 TABLET | Refills: 0 | Status: SHIPPED | OUTPATIENT
Start: 2021-12-02 | End: 2021-12-12

## 2021-12-02 NOTE — PROGRESS NOTES
Here for left middle finger possible ingrown nail  Onset few days ago  Sore to touch  Mild swelling  No injury  Does bite nails  No trouble with finger movement. Past Medical History:   Diagnosis Date    DM type 1 (diabetes mellitus, type 1) ([facility] [address]     Vitamin D deficiency         Past Surgical History:   Procedure Laterality Date    HX TYMPANOSTOMY           Family History   Problem Relation Age of Onset    Alcohol abuse Paternal Grandfather         cancer, type 2    Thyroid Disease Paternal Grandmother     Diabetes Neg Hx         Social History     Socioeconomic History    Marital status: SINGLE     Spouse name: Not on file    Number of children: Not on file    Years of education: Not on file    Highest education level: Not on file   Occupational History    Not on file   Tobacco Use    Smoking status: Former Smoker     Quit date: 2017     Years since quittin.2    Smokeless tobacco: Former User    Tobacco comment: VAPES   Substance and Sexual Activity    Alcohol use: No    Drug use: No    Sexual activity: Never     Partners: Female   Other Topics Concern    Not on file   Social History Narrative    Not on file     Social Determinants of Health     Financial Resource Strain:     Difficulty of Paying Living Expenses: Not on file   Food Insecurity:     Worried About [employer] in the Last Year: Not on file    Saleem of Food in the Last Year: Not on file   Transportation Needs:     Lack of Transportation (Medical): Not on file    Lack of Transportation (Non-Medical):  Not on file   Physical Activity:     Days of Exercise per Week: Not on file    Minutes of Exercise per Session: Not on file   Stress:     Feeling of Stress : Not on file   Social Connections:     Frequency of Communication with Friends and Family: Not on file    Frequency of Social Gatherings with Friends and Family: Not on file    Attends Yazidi Services: Not on file   [employer] Clubs or Organizations: Not on file    Attends Club or Organization Meetings: Not on file    Marital Status: Not on file   Intimate Partner Violence:     Fear of Current or Ex-Partner: Not on file    Emotionally Abused: Not on file    Physically Abused: Not on file    Sexually Abused: Not on file   Housing Stability:     Unable to Pay for Housing in the Last Year: Not on file    Number of [place] in the Last Year: Not on file    Unstable Housing in the Last Year: Not on file                ALLERGIES: Latex    Review of Systems   Constitutional: Negative for chills and fever. All other systems reviewed and are negative. Vitals:    12/02/21 1725   BP: 118/72   Pulse: 75   Resp: 18   Temp: 98.5 °F (36.9 °C)   SpO2: 97%       Physical Exam  Vitals reviewed. Constitutional:       Appearance: Normal appearance. Eyes:      Extraocular Movements: Extraocular movements intact. Cardiovascular:      Rate and Rhythm: Normal rate. Musculoskeletal:        Hands:       Comments: Full AROM of left middle finger   Neurological:      Mental Status: He is alert. Psychiatric:         Mood and Affect: Mood normal.         Behavior: Behavior normal.         MDM     Differential Diagnosis; Clinical Impression; Plan:       CLINICAL IMPRESSION:  (L03.012) Paronychia of left middle finger  (primary encounter diagnosis)    Orders Placed This Encounter      amoxicillin-clavulanate (Augmentin) 875-125 mg per tablet          Sig: Take 1 Tablet by mouth two (2) times a day for 10 days. Dispense:  20 Tablet          Refill:  0      Plan:  Likely early paronychia  Less likely gout. Possible ingrowing nail. Start augmentin due to nail biting  Warm epsom salt soaks  Follow up for re eval if not improved over next 1 week      We have reviewed concerning signs/symptoms, normal vs abnormal progression of medical condition and when to seek immediate medical attention.   Schedule with PCP or Urgent Care immediately for worsening or new symptoms.             Procedures

## 2021-12-20 ENCOUNTER — TELEPHONE (OUTPATIENT)
Dept: ENDOCRINOLOGY | Age: 26
End: 2021-12-20

## 2021-12-20 NOTE — TELEPHONE ENCOUNTER
Called and left a voice message reminding pt of today's appt. Pt was then asked to call the office it he is not able, ot call the office at [phone].

## 2022-03-07 ENCOUNTER — APPOINTMENT (OUTPATIENT)
Dept: GENERAL RADIOLOGY | Age: 27
DRG: 639 | End: 2022-03-07
Attending: EMERGENCY MEDICINE
Payer: COMMERCIAL

## 2022-03-07 ENCOUNTER — HOSPITAL ENCOUNTER (INPATIENT)
Age: 27
LOS: 1 days | Discharge: HOME | DRG: 639 | End: 2022-03-08
Attending: EMERGENCY MEDICINE | Admitting: STUDENT IN AN ORGANIZED HEALTH CARE EDUCATION/TRAINING PROGRAM
Payer: COMMERCIAL

## 2022-03-07 DIAGNOSIS — E08.10 DIABETIC KETOACIDOSIS WITHOUT COMA ASSOCIATED WITH DIABETES MELLITUS DUE TO UNDERLYING CONDITION (HCC): Primary | ICD-10-CM

## 2022-03-07 LAB
ADMINISTERED INITIALS, ADMINIT: NORMAL
ALBUMIN SERPL-MCNC: 4.9 G/DL (ref 3.5–5)
ALBUMIN/GLOB SERPL: 1.3 {RATIO} (ref 1.1–2.2)
ALP SERPL-CCNC: 125 U/L (ref 45–117)
ALT SERPL-CCNC: 15 U/L (ref 12–78)
AMYLASE SERPL-CCNC: 32 U/L (ref 25–115)
ANION GAP SERPL CALC-SCNC: 19 MMOL/L (ref 5–15)
ANION GAP SERPL CALC-SCNC: 21 MMOL/L (ref 5–15)
ANION GAP SERPL CALC-SCNC: 8 MMOL/L (ref 5–15)
ANION GAP SERPL CALC-SCNC: 9 MMOL/L (ref 5–15)
APPEARANCE UR: CLEAR
AST SERPL-CCNC: 16 U/L (ref 15–37)
ATRIAL RATE: 100 BPM
B-OH-BUTYR SERPL-SCNC: >4.42 MMOL/L
BACTERIA URNS QL MICRO: NEGATIVE /HPF
BASE DEFICIT BLD-SCNC: 17.6 MMOL/L
BASOPHILS # BLD: 0.1 K/UL (ref 0–0.1)
BASOPHILS NFR BLD: 1 % (ref 0–1)
BILIRUB SERPL-MCNC: 0.6 MG/DL (ref 0.2–1)
BILIRUB UR QL: NEGATIVE
BUN SERPL-MCNC: 13 MG/DL (ref 6–20)
BUN SERPL-MCNC: 14 MG/DL (ref 6–20)
BUN SERPL-MCNC: 17 MG/DL (ref 6–20)
BUN SERPL-MCNC: 18 MG/DL (ref 6–20)
BUN/CREAT SERPL: 13 (ref 12–20)
BUN/CREAT SERPL: 13 (ref 12–20)
BUN/CREAT SERPL: 14 (ref 12–20)
BUN/CREAT SERPL: 15 (ref 12–20)
CALCIUM SERPL-MCNC: 8 MG/DL (ref 8.5–10.1)
CALCIUM SERPL-MCNC: 8.2 MG/DL (ref 8.5–10.1)
CALCIUM SERPL-MCNC: 8.4 MG/DL (ref 8.5–10.1)
CALCIUM SERPL-MCNC: 9.4 MG/DL (ref 8.5–10.1)
CALCULATED P AXIS, ECG09: 77 DEGREES
CALCULATED R AXIS, ECG10: 81 DEGREES
CALCULATED T AXIS, ECG11: 62 DEGREES
CHLORIDE SERPL-SCNC: 100 MMOL/L (ref 97–108)
CHLORIDE SERPL-SCNC: 106 MMOL/L (ref 97–108)
CHLORIDE SERPL-SCNC: 108 MMOL/L (ref 97–108)
CHLORIDE SERPL-SCNC: 95 MMOL/L (ref 97–108)
CO2 SERPL-SCNC: 11 MMOL/L (ref 21–32)
CO2 SERPL-SCNC: 11 MMOL/L (ref 21–32)
CO2 SERPL-SCNC: 18 MMOL/L (ref 21–32)
CO2 SERPL-SCNC: 20 MMOL/L (ref 21–32)
COLOR UR: ABNORMAL
COMMENT, HOLDF: NORMAL
COMMENT, HOLDF: NORMAL
CREAT SERPL-MCNC: 0.95 MG/DL (ref 0.7–1.3)
CREAT SERPL-MCNC: 1.04 MG/DL (ref 0.7–1.3)
CREAT SERPL-MCNC: 1.13 MG/DL (ref 0.7–1.3)
CREAT SERPL-MCNC: 1.38 MG/DL (ref 0.7–1.3)
CRP SERPL-MCNC: <0.29 MG/DL (ref 0–0.6)
D50 ADMINISTERED, D50ADM: 0 ML
D50 ADMINISTERED, D50ADM: NORMAL ML
D50 ORDER, D50ORD: 0 ML
D50 ORDER, D50ORD: NORMAL ML
DIAGNOSIS, 93000: NORMAL
DIFFERENTIAL METHOD BLD: ABNORMAL
EOSINOPHIL # BLD: 0.1 K/UL (ref 0–0.4)
EOSINOPHIL NFR BLD: 1 % (ref 0–7)
EPITH CASTS URNS QL MICRO: ABNORMAL /LPF
ERYTHROCYTE [DISTWIDTH] IN BLOOD BY AUTOMATED COUNT: 11 % (ref 11.5–14.5)
EST. AVERAGE GLUCOSE BLD GHB EST-MCNC: 212 MG/DL
GLOBULIN SER CALC-MCNC: 3.7 G/DL (ref 2–4)
GLSCOM COMMENTS: NORMAL
GLUCOSE BLD STRIP.AUTO-MCNC: 145 MG/DL (ref 65–117)
GLUCOSE BLD STRIP.AUTO-MCNC: 146 MG/DL (ref 65–117)
GLUCOSE BLD STRIP.AUTO-MCNC: 150 MG/DL (ref 65–117)
GLUCOSE BLD STRIP.AUTO-MCNC: 152 MG/DL (ref 65–117)
GLUCOSE BLD STRIP.AUTO-MCNC: 154 MG/DL (ref 65–117)
GLUCOSE BLD STRIP.AUTO-MCNC: 154 MG/DL (ref 65–117)
GLUCOSE BLD STRIP.AUTO-MCNC: 155 MG/DL (ref 65–117)
GLUCOSE BLD STRIP.AUTO-MCNC: 156 MG/DL (ref 65–117)
GLUCOSE BLD STRIP.AUTO-MCNC: 177 MG/DL (ref 65–117)
GLUCOSE BLD STRIP.AUTO-MCNC: 181 MG/DL (ref 65–117)
GLUCOSE BLD STRIP.AUTO-MCNC: 207 MG/DL (ref 65–117)
GLUCOSE BLD STRIP.AUTO-MCNC: 223 MG/DL (ref 65–117)
GLUCOSE BLD STRIP.AUTO-MCNC: 228 MG/DL (ref 65–117)
GLUCOSE BLD STRIP.AUTO-MCNC: 255 MG/DL (ref 65–117)
GLUCOSE BLD STRIP.AUTO-MCNC: 279 MG/DL (ref 65–117)
GLUCOSE BLD STRIP.AUTO-MCNC: 285 MG/DL (ref 65–117)
GLUCOSE BLD STRIP.AUTO-MCNC: 307 MG/DL (ref 65–117)
GLUCOSE BLD STRIP.AUTO-MCNC: 375 MG/DL (ref 65–117)
GLUCOSE SERPL-MCNC: 144 MG/DL (ref 65–100)
GLUCOSE SERPL-MCNC: 160 MG/DL (ref 65–100)
GLUCOSE SERPL-MCNC: 298 MG/DL (ref 65–100)
GLUCOSE SERPL-MCNC: 364 MG/DL (ref 65–100)
GLUCOSE UR STRIP.AUTO-MCNC: >1000 MG/DL
GLUCOSE, GLC: 145 MG/DL
GLUCOSE, GLC: 146 MG/DL
GLUCOSE, GLC: 150 MG/DL
GLUCOSE, GLC: 152 MG/DL
GLUCOSE, GLC: 154 MG/DL
GLUCOSE, GLC: 154 MG/DL
GLUCOSE, GLC: 155 MG/DL
GLUCOSE, GLC: 156 MG/DL
GLUCOSE, GLC: 177 MG/DL
GLUCOSE, GLC: 181 MG/DL
GLUCOSE, GLC: 207 MG/DL
GLUCOSE, GLC: 279 MG/DL
GLUCOSE, GLC: 307 MG/DL
GLUCOSE, GLC: 375 MG/DL
GLUCOSE, GLC: NORMAL MG/DL
HBA1C MFR BLD: 9 % (ref 4–5.6)
HCO3 BLD-SCNC: 11.3 MMOL/L (ref 22–26)
HCT VFR BLD AUTO: 49.5 % (ref 36.6–50.3)
HGB BLD-MCNC: 17 G/DL (ref 12.1–17)
HGB UR QL STRIP: NEGATIVE
HIGH TARGET, HITG: 250 MG/DL
HIGH TARGET, HITG: NORMAL MG/DL
HYALINE CASTS URNS QL MICRO: ABNORMAL /LPF (ref 0–5)
IMM GRANULOCYTES # BLD AUTO: 0.1 K/UL (ref 0–0.04)
IMM GRANULOCYTES NFR BLD AUTO: 1 % (ref 0–0.5)
INSULIN ADMINSTERED, INSADM: 0.9 UNITS/HOUR
INSULIN ADMINSTERED, INSADM: 1 UNITS/HOUR
INSULIN ADMINSTERED, INSADM: 1.7 UNITS/HOUR
INSULIN ADMINSTERED, INSADM: 2.9 UNITS/HOUR
INSULIN ADMINSTERED, INSADM: 3.5 UNITS/HOUR
INSULIN ADMINSTERED, INSADM: 3.6 UNITS/HOUR
INSULIN ADMINSTERED, INSADM: 4.4 UNITS/HOUR
INSULIN ADMINSTERED, INSADM: 4.9 UNITS/HOUR
INSULIN ADMINSTERED, INSADM: 6.3 UNITS/HOUR
INSULIN ADMINSTERED, INSADM: 6.6 UNITS/HOUR
INSULIN ADMINSTERED, INSADM: NORMAL UNITS/HOUR
INSULIN ORDER, INSORD: 0.9 UNITS/HOUR
INSULIN ORDER, INSORD: 1 UNITS/HOUR
INSULIN ORDER, INSORD: 1.7 UNITS/HOUR
INSULIN ORDER, INSORD: 2.9 UNITS/HOUR
INSULIN ORDER, INSORD: 3.5 UNITS/HOUR
INSULIN ORDER, INSORD: 3.6 UNITS/HOUR
INSULIN ORDER, INSORD: 4.4 UNITS/HOUR
INSULIN ORDER, INSORD: 4.9 UNITS/HOUR
INSULIN ORDER, INSORD: 6.3 UNITS/HOUR
INSULIN ORDER, INSORD: 6.6 UNITS/HOUR
INSULIN ORDER, INSORD: NORMAL UNITS/HOUR
KETONES UR QL STRIP.AUTO: >80 MG/DL
LACTATE BLD-SCNC: 1.85 MMOL/L (ref 0.4–2)
LEUKOCYTE ESTERASE UR QL STRIP.AUTO: NEGATIVE
LIPASE SERPL-CCNC: 38 U/L (ref 73–393)
LOW TARGET, LOT: 150 MG/DL
LOW TARGET, LOT: NORMAL MG/DL
LYMPHOCYTES # BLD: 3.3 K/UL (ref 0.8–3.5)
LYMPHOCYTES NFR BLD: 30 % (ref 12–49)
MAGNESIUM SERPL-MCNC: 2 MG/DL (ref 1.6–2.4)
MAGNESIUM SERPL-MCNC: 2.2 MG/DL (ref 1.6–2.4)
MAGNESIUM SERPL-MCNC: 2.3 MG/DL (ref 1.6–2.4)
MCH RBC QN AUTO: 33.6 PG (ref 26–34)
MCHC RBC AUTO-ENTMCNC: 34.3 G/DL (ref 30–36.5)
MCV RBC AUTO: 97.8 FL (ref 80–99)
MINUTES UNTIL NEXT BG, NBG: 60 MIN
MINUTES UNTIL NEXT BG, NBG: NORMAL MIN
MONOCYTES # BLD: 0.7 K/UL (ref 0–1)
MONOCYTES NFR BLD: 6 % (ref 5–13)
MULTIPLIER, MUL: 0.01
MULTIPLIER, MUL: 0.02
MULTIPLIER, MUL: 0.03
MULTIPLIER, MUL: NORMAL
NEUTS SEG # BLD: 6.9 K/UL (ref 1.8–8)
NEUTS SEG NFR BLD: 61 % (ref 32–75)
NITRITE UR QL STRIP.AUTO: NEGATIVE
NRBC # BLD: 0 K/UL (ref 0–0.01)
NRBC BLD-RTO: 0 PER 100 WBC
ORDER INITIALS, ORDINIT: NORMAL
P-R INTERVAL, ECG05: 144 MS
PCO2 BLD: 36 MMHG (ref 35–45)
PH BLD: 7.1 [PH] (ref 7.35–7.45)
PH UR STRIP: 5 [PH] (ref 5–8)
PHOSPHATE SERPL-MCNC: 3.4 MG/DL (ref 2.6–4.7)
PLATELET # BLD AUTO: 249 K/UL (ref 150–400)
PMV BLD AUTO: 11 FL (ref 8.9–12.9)
PO2 BLD: 32 MMHG (ref 80–100)
POTASSIUM SERPL-SCNC: 3.8 MMOL/L (ref 3.5–5.1)
POTASSIUM SERPL-SCNC: 4 MMOL/L (ref 3.5–5.1)
POTASSIUM SERPL-SCNC: 4.1 MMOL/L (ref 3.5–5.1)
POTASSIUM SERPL-SCNC: 4.3 MMOL/L (ref 3.5–5.1)
PROCALCITONIN SERPL-MCNC: <0.05 NG/ML
PROT SERPL-MCNC: 8.6 G/DL (ref 6.4–8.2)
PROT UR STRIP-MCNC: 30 MG/DL
Q-T INTERVAL, ECG07: 370 MS
QRS DURATION, ECG06: 80 MS
QTC CALCULATION (BEZET), ECG08: 477 MS
RBC # BLD AUTO: 5.06 M/UL (ref 4.1–5.7)
RBC #/AREA URNS HPF: ABNORMAL /HPF (ref 0–5)
SAMPLES BEING HELD,HOLD: NORMAL
SAMPLES BEING HELD,HOLD: NORMAL
SAO2 % BLD: 43.1 % (ref 92–97)
SERVICE CMNT-IMP: ABNORMAL
SODIUM SERPL-SCNC: 127 MMOL/L (ref 136–145)
SODIUM SERPL-SCNC: 130 MMOL/L (ref 136–145)
SODIUM SERPL-SCNC: 134 MMOL/L (ref 136–145)
SODIUM SERPL-SCNC: 135 MMOL/L (ref 136–145)
SP GR UR REFRACTOMETRY: 1.02 (ref 1–1.03)
SPECIMEN TYPE: ABNORMAL
UA: UC IF INDICATED,UAUC: ABNORMAL
UROBILINOGEN UR QL STRIP.AUTO: 0.2 EU/DL (ref 0.2–1)
VENTRICULAR RATE, ECG03: 100 BPM
WBC # BLD AUTO: 11.1 K/UL (ref 4.1–11.1)
WBC URNS QL MICRO: ABNORMAL /HPF (ref 0–4)

## 2022-03-07 PROCEDURE — 87086 URINE CULTURE/COLONY COUNT: CPT

## 2022-03-07 PROCEDURE — 99356 PR PROLONGED SVC I/P OR OBS SETTING 1ST HOUR: CPT | Performed by: CLINICAL NURSE SPECIALIST

## 2022-03-07 PROCEDURE — 81001 URINALYSIS AUTO W/SCOPE: CPT

## 2022-03-07 PROCEDURE — 74011636637 HC RX REV CODE- 636/637: Performed by: STUDENT IN AN ORGANIZED HEALTH CARE EDUCATION/TRAINING PROGRAM

## 2022-03-07 PROCEDURE — 71045 X-RAY EXAM CHEST 1 VIEW: CPT

## 2022-03-07 PROCEDURE — 80048 BASIC METABOLIC PNL TOTAL CA: CPT

## 2022-03-07 PROCEDURE — 74011000250 HC RX REV CODE- 250: Performed by: STUDENT IN AN ORGANIZED HEALTH CARE EDUCATION/TRAINING PROGRAM

## 2022-03-07 PROCEDURE — 83036 HEMOGLOBIN GLYCOSYLATED A1C: CPT

## 2022-03-07 PROCEDURE — 82150 ASSAY OF AMYLASE: CPT

## 2022-03-07 PROCEDURE — 74011250636 HC RX REV CODE- 250/636: Performed by: STUDENT IN AN ORGANIZED HEALTH CARE EDUCATION/TRAINING PROGRAM

## 2022-03-07 PROCEDURE — 74011250636 HC RX REV CODE- 250/636: Performed by: EMERGENCY MEDICINE

## 2022-03-07 PROCEDURE — 83735 ASSAY OF MAGNESIUM: CPT

## 2022-03-07 PROCEDURE — 96374 THER/PROPH/DIAG INJ IV PUSH: CPT

## 2022-03-07 PROCEDURE — 99233 SBSQ HOSP IP/OBS HIGH 50: CPT | Performed by: CLINICAL NURSE SPECIALIST

## 2022-03-07 PROCEDURE — 93005 ELECTROCARDIOGRAM TRACING: CPT

## 2022-03-07 PROCEDURE — 80053 COMPREHEN METABOLIC PANEL: CPT

## 2022-03-07 PROCEDURE — 74011000258 HC RX REV CODE- 258: Performed by: EMERGENCY MEDICINE

## 2022-03-07 PROCEDURE — 85025 COMPLETE CBC W/AUTO DIFF WBC: CPT

## 2022-03-07 PROCEDURE — 36415 COLL VENOUS BLD VENIPUNCTURE: CPT

## 2022-03-07 PROCEDURE — 74011000258 HC RX REV CODE- 258: Performed by: INTERNAL MEDICINE

## 2022-03-07 PROCEDURE — 86140 C-REACTIVE PROTEIN: CPT

## 2022-03-07 PROCEDURE — 84145 PROCALCITONIN (PCT): CPT

## 2022-03-07 PROCEDURE — 65660000001 HC RM ICU INTERMED STEPDOWN

## 2022-03-07 PROCEDURE — 74011636637 HC RX REV CODE- 636/637: Performed by: EMERGENCY MEDICINE

## 2022-03-07 PROCEDURE — 82010 KETONE BODYS QUAN: CPT

## 2022-03-07 PROCEDURE — 99285 EMERGENCY DEPT VISIT HI MDM: CPT

## 2022-03-07 PROCEDURE — 82962 GLUCOSE BLOOD TEST: CPT

## 2022-03-07 PROCEDURE — 96361 HYDRATE IV INFUSION ADD-ON: CPT

## 2022-03-07 PROCEDURE — 74011250636 HC RX REV CODE- 250/636: Performed by: INTERNAL MEDICINE

## 2022-03-07 PROCEDURE — 84100 ASSAY OF PHOSPHORUS: CPT

## 2022-03-07 PROCEDURE — 83690 ASSAY OF LIPASE: CPT

## 2022-03-07 PROCEDURE — 83605 ASSAY OF LACTIC ACID: CPT

## 2022-03-07 RX ORDER — SODIUM CHLORIDE 0.9 % (FLUSH) 0.9 %
5-40 SYRINGE (ML) INJECTION AS NEEDED
Status: DISCONTINUED | OUTPATIENT
Start: 2022-03-07 | End: 2022-03-08 | Stop reason: HOSPADM

## 2022-03-07 RX ORDER — SODIUM CHLORIDE 9 MG/ML
125 INJECTION, SOLUTION INTRAVENOUS CONTINUOUS
Status: DISCONTINUED | OUTPATIENT
Start: 2022-03-07 | End: 2022-03-07

## 2022-03-07 RX ORDER — MAGNESIUM SULFATE 100 %
4 CRYSTALS MISCELLANEOUS AS NEEDED
Status: DISCONTINUED | OUTPATIENT
Start: 2022-03-07 | End: 2022-03-08 | Stop reason: HOSPADM

## 2022-03-07 RX ORDER — INSULIN LISPRO 100 [IU]/ML
INJECTION, SOLUTION INTRAVENOUS; SUBCUTANEOUS
Status: DISCONTINUED | OUTPATIENT
Start: 2022-03-07 | End: 2022-03-08

## 2022-03-07 RX ORDER — ONDANSETRON 2 MG/ML
8 INJECTION INTRAMUSCULAR; INTRAVENOUS
Status: COMPLETED | OUTPATIENT
Start: 2022-03-07 | End: 2022-03-07

## 2022-03-07 RX ORDER — ONDANSETRON 2 MG/ML
4 INJECTION INTRAMUSCULAR; INTRAVENOUS
Status: DISCONTINUED | OUTPATIENT
Start: 2022-03-07 | End: 2022-03-08 | Stop reason: HOSPADM

## 2022-03-07 RX ORDER — DEXTROSE MONOHYDRATE AND SODIUM CHLORIDE 5; .9 G/100ML; G/100ML
125 INJECTION, SOLUTION INTRAVENOUS CONTINUOUS
Status: DISCONTINUED | OUTPATIENT
Start: 2022-03-07 | End: 2022-03-07

## 2022-03-07 RX ORDER — ACETAMINOPHEN 650 MG/1
650 SUPPOSITORY RECTAL
Status: DISCONTINUED | OUTPATIENT
Start: 2022-03-07 | End: 2022-03-08 | Stop reason: HOSPADM

## 2022-03-07 RX ORDER — MAGNESIUM SULFATE 100 %
4 CRYSTALS MISCELLANEOUS AS NEEDED
Status: DISCONTINUED | OUTPATIENT
Start: 2022-03-07 | End: 2022-03-07 | Stop reason: SDUPTHER

## 2022-03-07 RX ORDER — ACETAMINOPHEN 325 MG/1
650 TABLET ORAL
Status: DISCONTINUED | OUTPATIENT
Start: 2022-03-07 | End: 2022-03-08 | Stop reason: HOSPADM

## 2022-03-07 RX ORDER — SODIUM CHLORIDE 9 MG/ML
100 INJECTION, SOLUTION INTRAVENOUS CONTINUOUS
Status: DISCONTINUED | OUTPATIENT
Start: 2022-03-07 | End: 2022-03-08 | Stop reason: HOSPADM

## 2022-03-07 RX ORDER — SODIUM CHLORIDE 0.9 % (FLUSH) 0.9 %
5-40 SYRINGE (ML) INJECTION EVERY 8 HOURS
Status: DISCONTINUED | OUTPATIENT
Start: 2022-03-07 | End: 2022-03-08 | Stop reason: HOSPADM

## 2022-03-07 RX ORDER — INSULIN GLARGINE 100 [IU]/ML
10 INJECTION, SOLUTION SUBCUTANEOUS ONCE
Status: COMPLETED | OUTPATIENT
Start: 2022-03-07 | End: 2022-03-07

## 2022-03-07 RX ORDER — ONDANSETRON 4 MG/1
4 TABLET, ORALLY DISINTEGRATING ORAL
Status: DISCONTINUED | OUTPATIENT
Start: 2022-03-07 | End: 2022-03-08 | Stop reason: HOSPADM

## 2022-03-07 RX ORDER — POLYETHYLENE GLYCOL 3350 17 G/17G
17 POWDER, FOR SOLUTION ORAL DAILY PRN
Status: DISCONTINUED | OUTPATIENT
Start: 2022-03-07 | End: 2022-03-08 | Stop reason: HOSPADM

## 2022-03-07 RX ORDER — ENOXAPARIN SODIUM 100 MG/ML
40 INJECTION SUBCUTANEOUS DAILY
Status: DISCONTINUED | OUTPATIENT
Start: 2022-03-07 | End: 2022-03-08 | Stop reason: HOSPADM

## 2022-03-07 RX ADMIN — DEXTROSE MONOHYDRATE AND SODIUM CHLORIDE 125 ML/HR: 5; .9 INJECTION, SOLUTION INTRAVENOUS at 16:37

## 2022-03-07 RX ADMIN — INSULIN GLARGINE 10 UNITS: 100 INJECTION, SOLUTION SUBCUTANEOUS at 21:16

## 2022-03-07 RX ADMIN — SODIUM CHLORIDE 1000 ML: 9 INJECTION, SOLUTION INTRAVENOUS at 04:16

## 2022-03-07 RX ADMIN — SODIUM CHLORIDE 4.9 UNITS/HR: 9 INJECTION, SOLUTION INTRAVENOUS at 05:16

## 2022-03-07 RX ADMIN — SODIUM CHLORIDE 125 ML/HR: 900 INJECTION, SOLUTION INTRAVENOUS at 05:11

## 2022-03-07 RX ADMIN — SODIUM CHLORIDE, PRESERVATIVE FREE 10 ML: 5 INJECTION INTRAVENOUS at 06:47

## 2022-03-07 RX ADMIN — SODIUM CHLORIDE 3.5 UNITS/HR: 9 INJECTION, SOLUTION INTRAVENOUS at 10:30

## 2022-03-07 RX ADMIN — ENOXAPARIN SODIUM 40 MG: 100 INJECTION SUBCUTANEOUS at 09:31

## 2022-03-07 RX ADMIN — SODIUM CHLORIDE 100 ML/HR: 900 INJECTION, SOLUTION INTRAVENOUS at 18:59

## 2022-03-07 RX ADMIN — SODIUM CHLORIDE 0.9 UNITS/HR: 9 INJECTION, SOLUTION INTRAVENOUS at 15:48

## 2022-03-07 RX ADMIN — SODIUM CHLORIDE 2.9 UNITS/HR: 9 INJECTION, SOLUTION INTRAVENOUS at 09:25

## 2022-03-07 RX ADMIN — ONDANSETRON HYDROCHLORIDE 8 MG: 2 SOLUTION INTRAMUSCULAR; INTRAVENOUS at 04:17

## 2022-03-07 RX ADMIN — DEXTROSE MONOHYDRATE AND SODIUM CHLORIDE 125 ML/HR: 5; .9 INJECTION, SOLUTION INTRAVENOUS at 08:59

## 2022-03-07 RX ADMIN — SODIUM CHLORIDE 0.9 UNITS/HR: 9 INJECTION, SOLUTION INTRAVENOUS at 13:42

## 2022-03-07 RX ADMIN — SODIUM CHLORIDE 1 UNITS/HR: 9 INJECTION, SOLUTION INTRAVENOUS at 14:39

## 2022-03-07 RX ADMIN — SODIUM CHLORIDE, PRESERVATIVE FREE 10 ML: 5 INJECTION INTRAVENOUS at 21:17

## 2022-03-07 NOTE — ED PROVIDER NOTES
The patient is a 22-year-old male with a past medical history significant for type 1 diabetes, vitamin D deficiency, status post tympanostomy who presents to the ER with a complaint of suprapubic discomfort accompanied by intractable nausea, vomiting that began approximately 4-6 hours ago without any relief. The patient check his blood sugar at home he was 365. He gave himself 5 units of NovoLog insulin with only minimal relief of his abdominal discomfort. He was just recently discharged from the hospital with diagnosis of DKA.   He has not been eating as usual.  He denies any fever and chills, sore throat, headache, neck or back pain, chest pain, shortness of breath with exertion, diarrhea, constipation, dysuria, sick contact, skin rash and recent travel           Past Medical History:   Diagnosis Date    DM type 1 (diabetes mellitus, type 1) ([facility] [address]     Vitamin D deficiency        Past Surgical History:   Procedure Laterality Date    HX TYMPANOSTOMY           Family History:   Problem Relation Age of Onset    Alcohol abuse Paternal Grandfather         cancer, type 2    Thyroid Disease Paternal Grandmother     Diabetes Neg Hx        Social History     Socioeconomic History    Marital status: SINGLE     Spouse name: Not on file    Number of children: Not on file    Years of education: Not on file    Highest education level: Not on file   Occupational History    Not on file   Tobacco Use    Smoking status: Former Smoker     Quit date: 2017     Years since quittin.5    Smokeless tobacco: Former User    Tobacco comment: VAPES   Vaping Use    Vaping Use: Not on file   Substance and Sexual Activity    Alcohol use: No    Drug use: No    Sexual activity: Never     Partners: Female   Other Topics Concern    Not on file   Social History Narrative    Not on file     Social Determinants of Health     Financial Resource Strain:     Difficulty of Paying Living Expenses: Not on file   Food Insecurity:     Worried About Running Out of Food in the Last Year: Not on file    Saleem of Food in the Last Year: Not on file   Transportation Needs:     Lack of Transportation (Medical): Not on file    Lack of Transportation (Non-Medical): Not on file   Physical Activity:     Days of Exercise per Week: Not on file    Minutes of Exercise per Session: Not on file   Stress:     Feeling of Stress : Not on file   Social Connections:     Frequency of Communication with Friends and Family: Not on file    Frequency of Social Gatherings with Friends and Family: Not on file    Attends Jehovah's witness Services: Not on file    Active Member of Clubs or Organizations: Not on file    Attends Club or Organization Meetings: Not on file    Marital Status: Not on file   Intimate Partner Violence:     Fear of Current or Ex-Partner: Not on file    Emotionally Abused: Not on file    Physically Abused: Not on file    Sexually Abused: Not on file   Housing Stability:     Unable to Pay for Housing in the Last Year: Not on file    Number of [place] in the Last Year: Not on file    Unstable Housing in the Last Year: Not on file         ALLERGIES: Latex    Review of Systems   All other systems reviewed and are negative. Vitals:    03/07/22 0342   BP: 126/77   Pulse: 99   Resp: 16   Temp: 97.7 °F (36.5 °C)   SpO2: 100%   Weight: 65.8 kg (145 lb)   Height: 5' 11\" (1.803 m)            Physical Exam  Vitals and nursing note reviewed. Exam conducted with a chaperone present. CONSTITUTIONAL: Well-appearing; well-nourished; in mild distress. HEAD: Normocephalic; atraumatic  EYES: PERRL; EOM intact; conjunctiva and sclera are clear bilaterally. ENT: No rhinorrhea; normal pharynx with no tonsillar hypertrophy; mucous membranes pink/moist, no erythema, no exudate. NECK: Supple; non-tender; no cervical lymphadenopathy  CARD: Normal S1, S2; no murmurs, rubs, or gallops. Regular rate and rhythm.   RESP: Normal respiratory effort; breath sounds clear and equal bilaterally; no wheezes, rhonchi, or rales. ABD: Normal bowel sounds; non-distended; non-tender; no palpable organomegaly, no masses, no bruits. Back Exam: Normal inspection; no vertebral point tenderness, no CVA tenderness. Normal range of motion. EXT: Normal ROM in all four extremities; non-tender to palpation; no swelling or deformity; distal pulses are normal, no edema. SKIN: Warm; dry; no rash. NEURO:Alert and oriented x 3, coherent, KIKA-XII grossly intact, sensory and motor are non-focal.        MDM  Number of Diagnoses or Management Options  Diabetic ketoacidosis without coma associated with diabetes mellitus due to underlying condition [facility])  Diagnosis management comments: Assessment: 60-year-old male who presents to the ER with symptoms consistent with DKA. He is radiologically stable at this time. He will need evaluation for electrolyte abnormality, dehydration, sepsis with occult bacteremia    Plan: Lab/IV fluids/antiemetics/EKG/chest x-ray/insulin bolus and drip/consult hospitalist/ Monitor and Reevaluate. Amount and/or Complexity of Data Reviewed  Clinical lab tests: reviewed and ordered  Tests in the radiology section of CPT®: ordered and reviewed  Tests in the medicine section of CPT®: ordered and reviewed  Discussion of test results with the performing providers: yes  Decide to obtain previous medical records or to obtain history from someone other than the patient: yes  Obtain history from someone other than the patient: yes  Review and summarize past medical records: yes  Discuss the patient with other providers: yes  Independent visualization of images, tracings, or specimens: yes           Procedures    ED EKG interpretation:  Rhythm: normal sinus rhythm; and regular . Rate (approx.): 100; Axis: normal; P wave: normal; QRS interval: normal ; ST/T wave: non-specific changes; in  Lead: Diffusely; Other findings: abnormal ekg.  This EKG was interpreted by Eugenia Doss MD,ED Provider. XRAY INTERPRETATION (ED MD)  Chest Xray  No acute process seen. Normal heart size. No bony abnormalities. No infiltrate. Viola Paiz MD 4:45 AM    CONSULT NOTE:  Viola Paiz MD spoke with Dr. Teresa Erickson of the adult hospitalist team. Discussed patient's presentation, history, physical assessment, and available diagnostic results. He will evaluate, write orders and admit the patient to the hospital. 4:46 AM    Perfect Serve Consult for Admission  4:46 AM    ED Room Number: ER09/09  Patient Name and age:  Olga Edwards 27 y.o.  male  Working Diagnosis:   1. Diabetic ketoacidosis without coma associated with diabetes mellitus due to underlying condition ([facility] [address]        COVID-19 Suspicion:  no  Sepsis present:  no  Reassessment needed: no  Code Status:  Full Code  Readmission: yes  Isolation Requirements:  no  Recommended Level of Care:  telemetry  Department:  [facility] Adult ED - [phone]  Admitting Provider: Dr. Teresa Erickson    Other: Aron Campbell for insulin management has been ordered    Total critical care time spent exclusive of procedures:  45 minutes.

## 2022-03-07 NOTE — PROGRESS NOTES
Reason for Admission:  Nausea and vomiting, history of DM 1                     RUR Score:      9% Low               Plan for utilizing home health:   No needs       PCP: First and Last name:  Peng Taveras MD     Name of Practice: [facility]   Are you a current patient: Yes/No: Yes   Approximate date of last visit: \"November\"   Can you participate in a virtual visit with your PCP:  [facility]                    Current Advanced Directive/Advance Care Plan: Full Code      Healthcare Decision Maker:   Click here to complete [address] including selection of the Healthcare Decision Maker Relationship (ie \"Primary\")             Primary Decision Maker: Doris Nava - Parent - [phone]    Primary Decision Maker: Carissarobert Gagnon - Parent - [phone]                  Transition of Care Plan:      CM contacted patient on mobile phone to complete CM assessment. Patient lives at home with both parents. Patient is normally independent with self-care and ADLs. Patient owns diabetic pump and supplies but no other DME. He is employed [employer]. [facility] at [address]. Patient's father reported at bedside and will provide transportation home at [place] which is anticipated for today pending evening labs. There are no identified CM needs at this time.                 Lb Palafox,MS

## 2022-03-07 NOTE — ED TRIAGE NOTES
Pt arrives from home with c/o N/V and chest pain starting at 2:30 AM. Pt reports DM1 with insulin pump. Pt checked sugar and it was 376. BG remained in 360's after giving self Novolog 5.6 units. Pt gave an additional bolus dose of 4.4 units. Pt BG in triage is 375.

## 2022-03-07 NOTE — ROUTINE PROCESS
TRANSFER - OUT REPORT:    Verbal report given to Nurse Darleen(name) on Oriana Harrison  being transferred to (unit) for routine progression of care       Report consisted of patients Situation, Background, Assessment and   Recommendations(SBAR). Information from the following report(s) SBAR, Kardex and Recent Results was reviewed with the receiving nurse. Lines:   Peripheral IV 03/07/22 Right Antecubital (Active)       Peripheral IV 03/07/22 Left Antecubital (Active)   Site Assessment Clean, dry, & intact 03/07/22 0620   Phlebitis Assessment 0 03/07/22 0620   Infiltration Assessment 0 03/07/22 0620   Dressing Status Clean, dry, & intact 03/07/22 0620   Dressing Type Transparent 03/07/22 0620        Opportunity for questions and clarification was provided.       Patient transported with:   [employer]

## 2022-03-07 NOTE — DIABETES MGMT
[address] [facility]    CLINICAL NURSE SPECIALIST CONSULT     Initial Presentation   Vane Bautista is a 27 y.o. male admitted  with c/o N/V.  + low po intake x 24h. Despite self adjusting for BG >300 with subcutaneous insulin BG did not improve. IN ED BG-375/ / Ph 7.1/+ betahydroxy->4.42/anion gap 21. HX:   Past Medical History:   Diagnosis Date    DM type 1 (diabetes mellitus, type 1) ([facility] [facility]     Vitamin D deficiency         INITIAL DX:   DKA (diabetic ketoacidosis) ([facility] [facility] [E11.10]     Current Treatment     TX: insulin infusion/ IVF    Consulted by Provider for advanced diabetes nursing assessment and care for:   [x] Inpatient management strategy  [x] Home management assessment      Hospital Course   Clinical progress has been complicated by DKA in setting of uncontrolled T1DM. Sunita Valdez Diabetes History   T1DM since age 7yrs old. Previous DKA in 10/2021 -With current admission this will make 4th admission for DKA since 17 yrs old. Had appointment scheduled with Dr. Mitra Santos on 12/20/21- with no show. Diabetes-related Medical History  Acute complications  DKA  Neurological complications  TBD  Microvascular disease  NONE  Macrovascular disease  NONE  Other associated conditions     NONE    Diabetes Medication History  Key Antihyperglycemic Medications             insulin aspart U-100 (NovoLOG U-100 Insulin aspart) 100 unit/mL injection USE AS DIRECTED INFUSE VIA INSULIN PUMP.  MAX DOSE 100 UNITS PER DAY           Medtronic 770G insulin pump (has sensor capability):  Basal rate: 24 hour total = 29.6 units                           12M - 3am       0.9units/hr                          3am - 8am       1.3                          8am - 12N       1.2                          12N - 12M       1.3  Bolus ratio:      1:8  Duration:         4 hours    Diabetes self-management practices:   Eating pattern-doesn't count carbs per se/has carb counting alessandra/ diet could be better per dad     Physical activity pattern-active young man     Monitoring pattern-Guardian CGM - uses it on/off, otherwise dose finger stick BG checks    Taking medications pattern-insulin pump-Medtronic insulin pump  [x] Consistent administration  [x] Affordable  Social determinants of health impacting diabetes self-management practices   Concerned that you need to know more about how to stay healthy with diabetes  Overall evaluation:    [x] Striving to achieving A1c target with drug therapy & self-care practices    Subjective   I could be better disciplined with my diabetes management\" Verbalizes he does not consistently correct like he should due to fears of going too low-especially before bed. Stated he had changed his infusion set yesterday and noticed that it was not \"as deep\" as usual but pump did not alarm that there was an occlusion. Likely this DKA was infusion set misplacment-Patient's father at bedside. Discussed if he has lumps/bumps on abdomen to not put site there- since he's had T1DM for several years the scar tissue can develop and he is aware of this. Discussed the option of changing to a steel needle infusion set to ensure better placement-     Works full time in sales  Lives with parents     Objective   Physical exam  General Normal weight male in no acute distress. Conversant and cooperative  Neuro  Alert, oriented   Vital Signs   Visit Vitals  BP (!) 112/54 (BP 1 Location: Left upper arm, BP Patient Position: At rest;Lying)   Pulse 80   Temp 97.7 °F (36.5 °C)   Resp 13   Ht 5' 11\" (1.803 m)   Wt 65.8 kg (145 lb)   SpO2 100%   BMI 20.22 kg/m²     Skin  Warm and dry. Heart   Regular rate and rhythm.  No murmurs, rubs or gallops  Lungs  Clear to auscultation without rales or rhonchi  Extremities No foot wounds      Laboratory  Recent Labs     03/07/22  0530 03/07/22  0346   * 364*   AGAP 19* 21*   WBC  --  11.1   CREA 1.13 1.38*   GFRNA >60 >60   AST  --  16   ALT  --  15       Factors impacting BG management  Factor Dose Comments   Nutrition:  NPO     Other:   Kidney function  Liver function     WNL  WNL      Blood glucose pattern      Significant diabetes-related events over the past 24-72 hours  DKA slowly resolving  Remains in insulin infusion-requiring IVF bolus, D5 added to IVF earlier this AM  BMP drawn 1130- previous BMP 0530    Assessment and Plan   Nursing Diagnosis Risk for unstable blood glucose pattern   Nursing Intervention Domain 5250 Decision-making Support   Nursing Interventions Examined current inpatient diabetes/blood glucose control   Explored factors facilitating and impeding inpatient management  Explored corrective strategies with patient and responsible inpatient provider   Informed patient of rational for insulin strategy while hospitalized     Nursing Diagnosis 55943 Ineffective Health Management   Nursing Intervention Domain 5250 Decision-makingSupport   Nursing Interventions Identified diabetes self-management practices impeding diabetes control  Discussed diabetes survival skills related to  1. Healthy Plate eating plan; given handouts  2. Role of physical activity in improving insulin sensitivity and action  3. Procedure for blood glucose monitoring & options for low-cost products available from [facility]   4. Medications plan at discharge     Evaluation   This is a 19yo  male-T1DM since 7yrs old. DKA, resolving. This is his 4th admission with DKA since age 17. Previous DKA in 10/2021 and hospitalized [facility]. He admits to lack of discipline with managing certain aspects of his diabetes to include consistent carb counting/ accurate correction doses/ and following carb consistent diet. This patient would benefit from diabetes self-management education and support [facility]) after discharge. Recommendations   1. Continue insulin infusion per DKA protocol  2. POC glucose hourly, BMP Q4h on insulin infusion  3.  Add dextrose in IVF once glucose under 250 on insulin infusion-DONE  4. When anion gap closed x2 on BMP, can convert back to his own insulin pump. 5.  ONCE ready to transition off he may put himself back on his own pump- parents to bring in all supplies for the pump. 6. DISCONTINUE insulin infusion 2 hours AFTER insulin pump started. 7. Moved endocrine follow up appointment up to March 18th 2pm with Dr. Riya Rouse. Billing Code(s)   69055  [id number]    Before making these care recommendations, I personally reviewed the hospitalization record, including notes, laboratory & diagnostic data and current medications, and examined the patient at the bedside (circumstances permitting) before making care recommendations. More than fifty (50) percent of the time was spent in patient counseling and/or care coordination.   Total minutes: [address] [place], CNS  Diabetes Clinical Nurse Specialist  [facility]  Access via [address] [place] [address]

## 2022-03-07 NOTE — H&P
History & Physical    Primary Care Provider: Marilyn Castillo MD  Source of Information: Patient and chart review    History of Presenting Illness:   Anthony Silva is a 27 y.o. male w/ hx of DM 1 who presents to hospital with complains of nausea and vomiting. States he had been in his usual state of health until 24 hours ago when he noted decreased appetite and has had poor p.o. intake. States his first meal of the day was at 5 PM.  He did develop nausea with one episode of nonbilious nonbloody emesis. Reports that his insulin pump/glucometer had began to read hypoglycemia with numbers in the 300s. He self-administered adjusted doses of subcu insulin without any improvement in his blood glucose levels. At time of my evaluation, the patient denies any fever, chills, chest or abdominal pain, nausea, vomiting, cough, congestion, recent illness, palpitations, or dysuria. Remarkable vitals on ER Presentations: VSS  Labs Remarkable for: , nA 127, CR 13.8, co2-11, ph 7.1, elev hydroxybutyrate  ER Images: CXR no acute process  ER Rx: 1L NS bolus     Review of Systems:  A comprehensive review of systems was negative except for that written in the History of Present Illness. Past Medical History:   Diagnosis Date    DM type 1 (diabetes mellitus, type 1) ([facility] [address]     Vitamin D deficiency       Past Surgical History:   Procedure Laterality Date    HX TYMPANOSTOMY  1999     Prior to Admission medications    Medication Sig Start Date End Date Taking? Authorizing Provider   cholecalciferol (VITAMIN D3) 25 mcg (1,000 unit) cap 2,000 IU = 2 CAP each dose, PO, daily, # 90 CAP, 1 Refills, Pharmacy: [place] [id number] 3/29/21   Provider, Historical   insulin aspart U-100 (NovoLOG U-100 Insulin aspart) 100 unit/mL injection USE AS DIRECTED INFUSE VIA INSULIN PUMP.  MAX DOSE 100 UNITS PER DAY 10/23/21   Flex Palomino MD   Blood-Glucose Meter [facility]) monitoring kit Use to test blood sugars 4 times per day. DX Code: E10.9 2/1/18   Elke Rausch MD   glucose blood VI test strips (ASCENSIA AUTODISC VI, ONE TOUCH ULTRA TEST VI) strip Check blood sugar 4x/day, Diagnosis E10.9 12/26/17   Elke Rausch MD   Lancets ([facility]) Misc To use up to 100 units daily 10/16/12   Sara Goldman MD     Allergies   Allergen Reactions    Latex Rash      Family History   Problem Relation Age of Onset    Alcohol abuse Paternal Grandfather         cancer, type 2    Thyroid Disease Paternal Grandmother     Diabetes Neg Hx         SOCIAL HISTORY:  Patient resides:  Independently x   Assisted Living    SNF    With family care       Smoking history:   None x   Former    Chronic      Alcohol history:   None x   Social    Chronic      Ambulates:   Independently x   w/cane    w/walker    w/wc    CODE STATUS:  DNR    Full x   Other      Objective:     Physical Exam:     Visit Vitals  /77 (BP 1 Location: Left upper arm, BP Patient Position: At rest)   Pulse 99   Temp 97.7 °F (36.5 °C)   Resp 16   Ht 5' 11\" (1.803 m)   Wt 65.8 kg (145 lb)   SpO2 100%   BMI 20.22 kg/m²      O2 Device: None (Room air)    General:  Alert, cooperative, no distress, appears stated age. Head:  Normocephalic, without obvious abnormality, atraumatic. Eyes:  Conjunctivae/corneas clear. PERRL, EOMs intact. Nose: Nares normal. Septum midline. Mucosa normal.        Neck: Supple, symmetrical, trachea midline, no carotid bruit and no JVD. Lungs:   Clear to auscultation bilaterally. Chest wall:  No tenderness or deformity. Heart:  Regular rate and rhythm, S1, S2 normal, no murmur, click, rub or gallop. Abdomen:   Soft, non-tender. Bowel sounds normal. No masses,  No organomegaly. Extremities: Extremities normal, atraumatic, no cyanosis or edema. Pulses: 2+ and symmetric all extremities.    Skin: Skin color, texture, turgor normal. No rashes or lesions   Neurologic: CNII-XII intact. Data Review:     Recent Days:  Recent Labs     03/07/22  0346   WBC 11.1   HGB 17.0   HCT 49.5        Recent Labs     03/07/22  0346   *   K 4.1   CL 95*   CO2 11*   *   BUN 18   CREA 1.38*   CA 9.4   ALB 4.9   ALT 15     No results for input(s): PH, PCO2, PO2, HCO3, FIO2 in the last 72 hours. 24 Hour Results:  Recent Results (from the past 24 hour(s))   EKG, 12 LEAD, INITIAL    Collection Time: 03/07/22  3:37 AM   Result Value Ref Range    Ventricular Rate 100 BPM    Atrial Rate 100 BPM    P-R Interval 144 ms    QRS Duration 80 ms    Q-T Interval 370 ms    QTC Calculation (Bezet) 477 ms    Calculated P Axis 77 degrees    Calculated R Axis 81 degrees    Calculated T Axis 62 degrees    Diagnosis       Normal sinus rhythm  Right atrial enlargement  Borderline ECG  When compared with ECG of 21-OCT-2021 21:28,  No significant change was found     GLUCOSE, POC    Collection Time: 03/07/22  3:39 AM   Result Value Ref Range    Glucose (POC) 375 (H) 65 - 117 mg/dL    Performed by Crissy Villalpando    CBC WITH AUTOMATED DIFF    Collection Time: 03/07/22  3:46 AM   Result Value Ref Range    WBC 11.1 4.1 - 11.1 K/uL    RBC 5.06 4.10 - 5.70 M/uL    HGB 17.0 12.1 - 17.0 g/dL    HCT 49.5 36.6 - 50.3 %    MCV 97.8 80.0 - 99.0 FL    MCH 33.6 26.0 - 34.0 PG    MCHC 34.3 30.0 - 36.5 g/dL    RDW 11.0 (L) 11.5 - 14.5 %    PLATELET 913 632 - 206 K/uL    MPV 11.0 8.9 - 12.9 FL    NRBC 0.0 0  WBC    ABSOLUTE NRBC 0.00 0.00 - 0.01 K/uL    NEUTROPHILS 61 32 - 75 %    LYMPHOCYTES 30 12 - 49 %    MONOCYTES 6 5 - 13 %    EOSINOPHILS 1 0 - 7 %    BASOPHILS 1 0 - 1 %    IMMATURE GRANULOCYTES 1 (H) 0.0 - 0.5 %    ABS. NEUTROPHILS 6.9 1.8 - 8.0 K/UL    ABS. LYMPHOCYTES 3.3 0.8 - 3.5 K/UL    ABS. MONOCYTES 0.7 0.0 - 1.0 K/UL    ABS. EOSINOPHILS 0.1 0.0 - 0.4 K/UL    ABS. BASOPHILS 0.1 0.0 - 0.1 K/UL    ABS. IMM.  GRANS. 0.1 (H) 0.00 - 0.04 K/UL    DF AUTOMATED     METABOLIC PANEL, COMPREHENSIVE Collection Time: 03/07/22  3:46 AM   Result Value Ref Range    Sodium 127 (L) 136 - 145 mmol/L    Potassium 4.1 3.5 - 5.1 mmol/L    Chloride 95 (L) 97 - 108 mmol/L    CO2 11 (LL) 21 - 32 mmol/L    Anion gap 21 (H) 5 - 15 mmol/L    Glucose 364 (H) 65 - 100 mg/dL    BUN 18 6 - 20 MG/DL    Creatinine 1.38 (H) 0.70 - 1.30 MG/DL    BUN/Creatinine ratio 13 12 - 20      GFR est AA >60 >60 ml/min/1.73m2    GFR est non-AA >60 >60 ml/min/1.73m2    Calcium 9.4 8.5 - 10.1 MG/DL    Bilirubin, total 0.6 0.2 - 1.0 MG/DL    ALT (SGPT) 15 12 - 78 U/L    AST (SGOT) 16 15 - 37 U/L    Alk. phosphatase 125 (H) 45 - 117 U/L    Protein, total 8.6 (H) 6.4 - 8.2 g/dL    Albumin 4.9 3.5 - 5.0 g/dL    Globulin 3.7 2.0 - 4.0 g/dL    A-G Ratio 1.3 1.1 - 2.2     LIPASE    Collection Time: 03/07/22  3:46 AM   Result Value Ref Range    Lipase 38 (L) 73 - 393 U/L   AMYLASE    Collection Time: 03/07/22  3:46 AM   Result Value Ref Range    Amylase 32 25 - 115 U/L   PROCALCITONIN    Collection Time: 03/07/22  3:46 AM   Result Value Ref Range    Procalcitonin <0.05 ng/mL   C REACTIVE PROTEIN, QT    Collection Time: 03/07/22  3:46 AM   Result Value Ref Range    C-Reactive protein <0.29 0.00 - 0.60 mg/dL   SAMPLES BEING HELD    Collection Time: 03/07/22  3:46 AM   Result Value Ref Range    SAMPLES BEING HELD 1blu     COMMENT        Add-on orders for these samples will be processed based on acceptable specimen integrity and analyte stability, which may vary by analyte.    BETA-HYDROXYBUTYRATE    Collection Time: 03/07/22  3:46 AM   Result Value Ref Range    B-hydroxybutyrate >4.42 (H) <0.40 mmol/L   BLOOD GAS,LACTIC ACID, POC    Collection Time: 03/07/22  3:57 AM   Result Value Ref Range    pH (POC) 7.10 (LL) 7.35 - 7.45      pCO2 (POC) 36.0 35.0 - 45.0 MMHG    pO2 (POC) 32 (LL) 80 - 100 MMHG    HCO3 (POC) 11.3 (L) 22 - 26 MMOL/L    sO2 (POC) 43.1 (L) 92 - 97 %    Base deficit (POC) 17.6 mmol/L    Specimen type (POC) VENOUS BLOOD      Performed by DUDLEY Velasquez Faster     Lactic Acid (POC) 1.85 0.40 - 2.00 mmol/L         Imaging:     Assessment:     Leslye Venegas is a 27 y.o. male w/ hx of DM 1 who is admitted for DKA       Plan:       Diabetic Ketoacidosis  -Admit to and monitor on IMCU  -Insulin gtt  -DC insulin pump  -DKA Protocol  -Serial BMPs  -Monitor and replete lytes    Hyponatremia  -Pseudohyponatremia  -Monitor with serial BMPs    Nausea and Vomiting  -Zofran prn          FEN/GI -  [email or web address] ml/hr  Activity - as tolerated  DVT prophylaxis - Lovenox  GI prophylaxis -  NI  Disposition - Home    CODE STATUS:  Full code       Signed By: Dhruv Jordan MD     March 7, 2022

## 2022-03-07 NOTE — PROGRESS NOTES
1654 Patient blood glucose has been in target range for greater than four hours. Anion gap is closed in two consecutive BMPs. MD and diabetes management notified. 1710 Per MD and diabetes management, patient replaced home insulin pump and will discontinue insulin drip in 2 hours. 1804 Patient started on clear liquids, given broth and juice. 1900 Insulin drip stopped; patient had just finished dinner tray . Patient stated \"I just put in the carb count bolus; it hasn't even gone in yet. \" Informed patient that we will recheck sugar in one hour to ensure controlled levels.

## 2022-03-08 VITALS
HEART RATE: 83 BPM | RESPIRATION RATE: 21 BRPM | SYSTOLIC BLOOD PRESSURE: 132 MMHG | WEIGHT: 138.23 LBS | HEIGHT: 71 IN | BODY MASS INDEX: 19.35 KG/M2 | DIASTOLIC BLOOD PRESSURE: 76 MMHG | TEMPERATURE: 97.6 F | OXYGEN SATURATION: 99 %

## 2022-03-08 PROBLEM — E11.10 DKA (DIABETIC KETOACIDOSIS) (HCC): Status: RESOLVED | Noted: 2021-10-21 | Resolved: 2022-03-08

## 2022-03-08 LAB
BACTERIA SPEC CULT: NORMAL
GLUCOSE BLD STRIP.AUTO-MCNC: 111 MG/DL (ref 65–117)
GLUCOSE BLD STRIP.AUTO-MCNC: 111 MG/DL (ref 65–117)
GLUCOSE BLD STRIP.AUTO-MCNC: 148 MG/DL (ref 65–117)
GLUCOSE BLD STRIP.AUTO-MCNC: 171 MG/DL (ref 65–117)
SERVICE CMNT-IMP: ABNORMAL
SERVICE CMNT-IMP: ABNORMAL
SERVICE CMNT-IMP: NORMAL

## 2022-03-08 PROCEDURE — 82962 GLUCOSE BLOOD TEST: CPT

## 2022-03-08 PROCEDURE — 74011250636 HC RX REV CODE- 250/636: Performed by: INTERNAL MEDICINE

## 2022-03-08 PROCEDURE — 74011250637 HC RX REV CODE- 250/637: Performed by: INTERNAL MEDICINE

## 2022-03-08 PROCEDURE — 99233 SBSQ HOSP IP/OBS HIGH 50: CPT | Performed by: CLINICAL NURSE SPECIALIST

## 2022-03-08 RX ORDER — POTASSIUM CHLORIDE 750 MG/1
40 TABLET, FILM COATED, EXTENDED RELEASE ORAL
Status: COMPLETED | OUTPATIENT
Start: 2022-03-08 | End: 2022-03-08

## 2022-03-08 RX ORDER — INSULIN LISPRO 100 [IU]/ML
INJECTION, SOLUTION INTRAVENOUS; SUBCUTANEOUS
Status: DISCONTINUED | OUTPATIENT
Start: 2022-03-08 | End: 2022-03-08

## 2022-03-08 RX ADMIN — SODIUM CHLORIDE 100 ML/HR: 900 INJECTION, SOLUTION INTRAVENOUS at 05:06

## 2022-03-08 RX ADMIN — POTASSIUM CHLORIDE 40 MEQ: 750 TABLET, EXTENDED RELEASE ORAL at 10:15

## 2022-03-08 NOTE — PROGRESS NOTES
Bedside and Verbal shift change report given to Rachelle Wheeler (oncoming nurse) by Ana Lemons RN (offgoing nurse). Report included the following information SBAR, Kardex, ED Summary, Intake/Output, MAR, Recent Results and Cardiac Rhythm NSR.

## 2022-03-08 NOTE — PROGRESS NOTES
3/7/22 8:29 PM   [phone] Hospital or Facility: [facility] From: Rend[facility] Situ RE: Okobojirenan Orozco Apr 6, 1995 RM: 26 Pt came in w A, Chata Hopkins stopped at 1900, at that same time patient finished the dinner tray and gave himself a carb count bolus. Sugar recheck at 2000 found to be 285. Any orders? Need Callback: NO CALLBACK REQ 4W TELE  Read 8:32 PM     Ramona Ortiz NP   3/7/22 8:32 PM   Im not on service tonight     3/7/22 8:34 PM   [id number] Migule orozco, thank you! Read 8:34 PM         3/7/22 8:37 PM   [phone] Hospital or Facility: [facility] From: [facility] Situ RE: Okobojirenan Orozco 1995 RM: 440 Initially I reached out to [facility] but she said she is not on call tonight. Pt came in w DKA, Chata Hopkins stopped at 1900, at that same time patient finished the dinner tray and gave himself a carb count bolus. Sugar recheck at 2000 found to be 285. Any orders? Need Callback: NO CALLBACK REQ 4W TELE  Read 8:38 PM     Soledad Melvin MD   3/7/22 8:38 PM   Recheck     3/7/22 8:45 PM   223  Read 8:45 PM     Soledad Melvin MD   3/7/22 8:45 PM   Did he get any lantus? 3/7/22 8:49 PM   We didnt give him any  Read 8:50 PM     Génesis Myers MD   3/7/22 8:51 PM   Give him 10u of Lantus    3/7/22 8:53 PM   Thank you, keep checking sugar every hour? Read 8:53 PM     Naseem AMARAL MD   3/7/22 8:54 PM   Yes        3/8/22 1:16 AM   [phone] Hospital or Facility: [facility] From: Rendall Situ RE: Yanelisrenan Orozco Apr 6, 1995 RM: 26 Pt admitted w DKA, came off insulin drip yesterday at 1900, I spoke to Dr. Kirti Hayward around 2100 and we are still checking sugars every hour. They have been 255 at [address] [facility], 285 at 2002, 223 at 2043, Lantus 10u at 2116, 228 at 2234, 171 at [address] [facility], and 148 at [facility]. Would you like us to continue checking every hour or have any orders?  Need Callback: NO CALLBACK REQ 4W TELE  Read 1:17 AM     Poli Lord NP   3/8/22 1:17 AM   No. Give me a minute and I will look at it    Poli Lord NP   3/8/22 1:24 AM   ordered AC/HS     3/8/22 1:24 AM   Ok thank you. Should we check his sugar anymore before breakfast time? Read 1:24 AM     Home Luo NP   3/8/22 1:25 AM   You can check it in two hour again just to make sure the BG didnt drop low     Bedside and Verbal shift change report given to Titi Amaro (oncoming nurse) by Maryjo Monahan (offgoing nurse).  Report included the following information SBAR, Kardex, MAR, Accordion, Recent Results and Cardiac Rhythm SB-SR.

## 2022-03-08 NOTE — DISCHARGE SUMMARY
Discharge Summary       PATIENT ID: Flo Harrell  MRN: [id number]   YOB: 1995    DATE OF ADMISSION: 3/7/2022  3:47 AM    DATE OF DISCHARGE: 3/8/2022    PRIMARY CARE PROVIDER: Lashaun Fuentes MD     ATTENDING PHYSICIAN: Tere Guerrero MD   DISCHARGING PROVIDER: Tere Guerrero MD    To contact this individual call [phone] and ask the  to page. If unavailable ask to be transferred the Adult Hospitalist Department. CONSULTATIONS: IP CONSULT TO HOSPITALIST    PROCEDURES/SURGERIES: * No surgery found *    DISCHARGE DIAGNOSES:   Per notes:    Pt admitted DKA  transitioned to closed gap by afternoon 3/7  lamont cl liq to reg diabetic diet by am 3/8/2022   Home on uual care w insulin pump   ROS neg on discharge  Seen by diabetic specialty team while here and treated w protocol insulin pump regimen IV while here,frequent lab/electrolytes accomplished and nromalized, k lowish and as pt goes form met acidosis to resolution, k will driop, additional k po on dischrge      DX DKA, met acidosis, DM 1. Dehydration. ,     Plan: home regimen, usual diet. [address] [facility] COURSE:   As above     DISCHARGE DIAGNOSES / PLAN:      As above   BMI: Body mass index is 19.28 kg/m². . This patient: Has a BMI within normal limits. PENDING TEST RESULTS:   At the time of discharge the following test results are still pending: na      ADDITIONAL CARE RECOMMENDATIONS:   Na      NOTIFY YOUR PHYSICIAN FOR ANY OF THE FOLLOWING:   Fever over 101 degrees for 24 hours. Chest pain, shortness of breath, fever, chills, nausea, vomiting, diarrhea, change in mentation, falling, weakness, bleeding. Severe pain or pain not relieved by medications, as well as any other signs or symptoms that you may have questions about.     FOLLOW UP APPOINTMENTS:    Follow-up Information     Follow up With Specialties Details Why Contact Info    Lashaun Fuentes MD Endocrinology On 3/18/2022 @ 2PM for diabetes follow up , As needed [address] [place] [address],[address] Endocrinology  [address] [facility]  [phone]               DIET: Resume previous diet    ACTIVITY: Activity as tolerated    EQUIPMENT needed: na     DISCHARGE MEDICATIONS:  Current Discharge Medication List      CONTINUE these medications which have NOT CHANGED    Details   cholecalciferol (VITAMIN D3) 25 mcg (1,000 unit) cap 2,000 IU = 2 CAP each dose, PO, daily, # 90 CAP, 1 Refills, Pharmacy: [id number]      insulin aspart U-100 (NovoLOG U-100 Insulin aspart) 100 unit/mL injection USE AS DIRECTED INFUSE VIA INSULIN PUMP. MAX DOSE 100 UNITS PER DAY  Qty: 90 mL, Refills: 4      Blood-Glucose Meter (ONETOUCH ULTRA2) monitoring kit Use to test blood sugars 4 times per day. DX Code: E10.9  Qty: 1 Kit, Refills: 0      glucose blood VI test strips (ASCENSIA AUTODISC VI, ONE TOUCH ULTRA TEST VI) strip Check blood sugar 4x/day, Diagnosis E10.9  Qty: 400 Strip, Refills: 3      Lancets (ONE TOUCH DELICA) Misc To use up to 100 units daily  Qty: 1 Package, Refills: 11             DISPOSITION:    Home With:   OT  PT  HH  RN       Long term SNF/Inpatient Rehab   x Independent/assisted living    Hospice    Other:       PATIENT CONDITION AT DISCHARGE:     Functional status    Poor     Deconditioned    x Independent      Cognition    x Lucid     Forgetful     Dementia      Catheters/lines (plus indication)    Huertas     PICC     PEG    x None      Code status    x Full code     DNR      PHYSICAL EXAMINATION AT DISCHARGE:  General:          Alert, cooperative, no distress, appears stated age.      Visit Vitals  /66 (BP 1 Location: Right upper arm, BP Patient Position: Lying left side)   Pulse 79   Temp 99 °F (37.2 °C)   Resp 13   Ht 5' 11\" (1.803 m)   Wt 62.7 kg (138 lb 3.7 oz)   SpO2 99%   BMI 19.28 kg/m²        CHRONIC MEDICAL DIAGNOSES:  Problem List as of 3/8/2022 Date Reviewed: 3/8/2022          Codes Class Noted - Resolved    Diabetes mellitus ([facility] [address] ICD-10-CM: E11.9  ICD-9-CM: 250.00  3/21/2013 - Present    Overview Signed 11/30/2016  7:27 PM by Odlays Chatterjee RN     1999 at age 5             RESOLVED: DKA (diabetic ketoacidosis) [facility]) ICD-10-CM: E11.10  ICD-9-CM: 250.12  10/21/2021 - 3/8/2022              Greater than 20  minutes were spent with the patient on counseling and coordination of care    Signed:   David Maloney MD  3/8/2022  9:15 AM

## 2022-03-08 NOTE — PROGRESS NOTES
1030 Patient given discharge paperwork, opportunities for questions provided, VSS. Patient discharged home with father.

## 2022-03-08 NOTE — DISCHARGE INSTRUCTIONS
Discharge Instructions       PATIENT ID: Bella Shah  MRN: [id number]   YOB: 1995    DATE OF ADMISSION: 3/7/2022  3:47 AM    DATE OF DISCHARGE: 3/8/2022    PRIMARY CARE PROVIDER: Shani Rpoer MD     ATTENDING PHYSICIAN: Timothy Mccarthy MD  DISCHARGING PROVIDER: Nadia Epstein MD    To contact this individual call [phone] and ask the  to page. If unavailable ask to be transferred the Adult Hospitalist Department. DISCHARGE DIAGNOSES DKA     CONSULTATIONS: IP CONSULT TO HOSPITALIST    PROCEDURES/SURGERIES: * No surgery found *    PENDING TEST RESULTS:   At the time of discharge the following test results are still pending: na     FOLLOW UP APPOINTMENTS:   Follow-up Information     Follow up With Specialties Details Why Contact Info    Shani Roper MD Endocrinology On 3/18/2022 @ 2PM for diabetes follow up , As needed [address] [place] [address],[address] Endocrinology  [place] [address]  [phone]             ADDITIONAL CARE RECOMMENDATIONS: na    DIET: Resume previous diet     ACTIVITY: Activity as tolerated    WOUND CARE: na     EQUIPMENT needed: na             DISCHARGE MEDICATIONS:   See Medication Reconciliation Form    · It is important that you take the medication exactly as they are prescribed. · Keep your medication in the bottles provided by the pharmacist and keep a list of the medication names, dosages, and times to be taken in your wallet. · Do not take other medications without consulting your doctor. NOTIFY YOUR PHYSICIAN FOR ANY OF THE FOLLOWING:   Fever over 101 degrees for 24 hours. Chest pain, shortness of breath, fever, chills, nausea, vomiting, diarrhea, change in mentation, falling, weakness, bleeding. Severe pain or pain not relieved by medications. Or, any other signs or symptoms that you may have questions about.       DISPOSITION:    Home With:   OT  PT  HH  RN       SNF/Inpatient Rehab/LTAC   x Independent/assisted living    Hospice Other:          Signed:   Lillie Rivera MD  3/8/2022  9:14 AM

## 2022-03-08 NOTE — PROGRESS NOTES
Physician Progress Note      Bimal Winter  CSN #:                  [id number]  :                       1995  ADMIT DATE:       3/7/2022 3:47 AM  DISCH DATE:  RESPONDING  PROVIDER #:        Dean Torres MD          QUERY TEXT:    Pt presented with nausea and vomiting and was admitted with DKA. Pt noted to have dehydration and sCr of 1.38 at admission on 3/7/22, which decreased sequentially during admission to 0.95 on the day of discharge, 3/8/22. Although no sCr baseline for the pt is stated, the pt was noted to have a sCr of 0.86 and 0.83 PTA on 10/23/21. If possible, please document if you are evaluating and/or treating any of the following: The medical record reflects the following:    Risk Factors: DM, DKA    Clinical Indicators:  -- sCr = 1.38 (3/7 at admission), 1.13 & 1.04 (3/7), 0.95 (3/8); PTA sCr on 10/23/21 = 0.86, 0.83  -- D/C summary noted DKA, met acidosis, DM 1. Dehydration. Treatment: IVF, serial lab monitoring of renal function    Thank-you,  Ronnie Chapin RN, [facility]  Clinical   [phone]  [email or web address]      Defined by Kidney Disease Improving Global Outcomes (KDIGO) clinical practice guideline for acute kidney injury:  -Increase in SCr by greater than or equal to 0.3 mg/dl within 48?hours; or  -Increase or decrease in SCr to greater than or equal to 1.5 times baseline, which is known or presumed to have occurred within the prior 7 days; or  -Urine volume < 0.5ml/kg/h for 6 hours  Options provided:  -- Acute kidney injury  -- No acute kidney injury  -- Other - I will add my own diagnosis  -- Disagree - Not applicable / Not valid  -- Disagree - Clinically unable to determine / Unknown  -- Refer to Clinical Documentation Reviewer    PROVIDER RESPONSE TEXT:    This patient did not have an acute kidney injury.     Query created by: Emmy Foreman on 3/8/2022 10:20 AM      Electronically signed by:  Dean Torres MD 3/8/2022 10:35 AM

## 2022-03-08 NOTE — DIABETES MGMT
[facility]    CLINICAL NURSE SPECIALIST CONSULT     Initial Presentation   Sriram Wesley is a 27 y.o. male admitted  with c/o N/V.  + low po intake x 24h. Despite self adjusting for BG >300 with subcutaneous insulin BG did not improve. IN ED BG-375/ / Ph 7.1/+ betahydroxy->4.42/anion gap 21. HX:   Past Medical History:   Diagnosis Date    DM type 1 (diabetes mellitus, type 1) ([facility] [address]     Vitamin D deficiency         INITIAL DX:   DKA (diabetic ketoacidosis) ([facility] [address] [E11.10]     Current Treatment     TX: insulin infusion/ IVF    Consulted by Provider for advanced diabetes nursing assessment and care for:   [x] Inpatient management strategy  [x] Home management assessment      Hospital Course   Clinical progress has been complicated by DKA in setting of uncontrolled T1DM. Archie Major Diabetes History   T1DM since age 7yrs old. Previous DKA in 10/2021 -With current admission this will make 4th admission for DKA since 17 yrs old. Had appointment scheduled with Dr. Michael Samson on 12/20/21- with no show. Diabetes-related Medical History  Acute complications  DKA  Neurological complications  TBD  Microvascular disease  NONE  Macrovascular disease  NONE  Other associated conditions     NONE    Diabetes Medication History  Key Antihyperglycemic Medications             insulin aspart U-100 (NovoLOG U-100 Insulin aspart) 100 unit/mL injection (Taking) USE AS DIRECTED INFUSE VIA INSULIN PUMP.  MAX DOSE 100 UNITS PER DAY           Medtronic 770G insulin pump (has sensor capability):  Basal rate: 24 hour total = 29.6 units                           12M - 3am       0.9units/hr                          3am - 8am       1.3                          8am - 12N       1.2                          12N - 12M       1.3  Bolus ratio:      1:8  Duration:         4 hours    Diabetes self-management practices:   Eating pattern-doesn't count carbs per se/has carb counting alessandra/ diet could be better per dad     Physical activity pattern-active young man     Monitoring pattern-Guardian CGM - uses it on/off, otherwise dose finger stick BG checks    Taking medications pattern-insulin pump-Medtronic insulin pump  [x] Consistent administration  [x] Affordable  Social determinants of health impacting diabetes self-management practices   Concerned that you need to know more about how to stay healthy with diabetes  Overall evaluation:    [x] Striving to achieving A1c target with drug therapy & self-care practices    Subjective   He is back on his insulin pump and managing without difficulties. Discussed with him that he is to make adjustments to his pump (carb coverage/ correction) based off our POC -Patient signed insulin pump responsibilities waiver and provided with carb count worksheet communication tool. Objective   Physical exam  General Normal weight male in no acute distress. Conversant and cooperative  Neuro  Alert, oriented   Vital Signs   Visit Vitals  /66 (BP 1 Location: Right upper arm, BP Patient Position: Lying left side)   Pulse 79   Temp 99 °F (37.2 °C)   Resp 13   Ht 5' 11\" (1.803 m)   Wt 62.7 kg (138 lb 3.7 oz)   SpO2 99%   BMI 19.28 kg/m²     Skin  Warm and dry. Heart   Regular rate and rhythm. No murmurs, rubs or gallops  Lungs  Clear to auscultation without rales or rhonchi  Extremities No foot wounds      Laboratory  Recent Labs     03/07/22  1547 03/07/22  1138 03/07/22  0530 03/07/22  0346 03/07/22  0346   * 144* 298*   < > 364*   AGAP 9 8 19*   < > 21*   WBC  --   --   --   --  11.1   CREA 0.95 1.04 1.13   < > 1.38*   GFRNA >60 >60 >60   < > >60   AST  --   --   --   --  16   ALT  --   --   --   --  15    < > = values in this interval not displayed.        Factors impacting BG management  Factor Dose Comments   Nutrition:  NPO     Other:   Kidney function  Liver function     WNL  WNL      Blood glucose pattern      Significant diabetes-related events over the past 24-72 hours  DKA slowly resolving  Remains in insulin infusion-requiring IVF bolus, D5 added to IVF earlier this AM  BMP drawn 1130- previous BMP 0530  3/8/22: Transitioned off insulin infusion to his own insulin pump last evening. After consuming clear liquid diet (which included grape juice/ and jello) BG spiked up to 285 even after patient bolused self through pump. Was given Lantus 10 units @ 2100. Fasting BG this  since 0300. Assessment and Plan   Nursing Diagnosis Risk for unstable blood glucose pattern   Nursing Intervention Domain 5250 Decision-making Support   Nursing Interventions Examined current inpatient diabetes/blood glucose control   Explored factors facilitating and impeding inpatient management  Explored corrective strategies with patient and responsible inpatient provider   Informed patient of rational for insulin strategy while hospitalized     Nursing Diagnosis [address]15 Ineffective Health Management   Nursing Intervention Domain 5250 Decision-makingSupport   Nursing Interventions Identified diabetes self-management practices impeding diabetes control  Discussed diabetes survival skills related to  1. Healthy Plate eating plan; given handouts  2. Role of physical activity in improving insulin sensitivity and action  3. Procedure for blood glucose monitoring & options for low-cost products available from [facility]   4. Medications plan at discharge     Evaluation   This is a 21yo  male-T1DM since 7yrs old. DKA, resolving. This is his 4th admission with DKA since age 17. Previous DKA in 10/2021 and hospitalized @[facility]. He admits to lack of discipline with managing certain aspects of his diabetes to include consistent carb counting/ accurate correction doses/ and following carb consistent diet. 3/8/22: Anticipate discharge today. Back on insulin pump- Discussed with RN to watch for hypoglycemia since he also has Lantus on board and insulin pump infusing-    Recommendations/ Discharge Planning   1. Patient waiver signed to manage his own insulin infusion pump  2. Moved endocrine follow up appointment up to March 18th 2pm with Dr. Hoyt Memos. 3. No adjustements made to insulin pump settings. Billing Code(s)   48325    Before making these care recommendations, I personally reviewed the hospitalization record, including notes, laboratory & diagnostic data and current medications, and examined the patient at the bedside (circumstances permitting) before making care recommendations. More than fifty (50) percent of the time was spent in patient counseling and/or care coordination.   Total minutes: [address] [facility] THIAGO Leong  Diabetes Clinical Nurse Specialist  [facility]  Access via Nichelle Granados

## 2022-03-18 NOTE — ADT AUTH CERT NOTES
[address]     FACILITY NPI :[id number]  FACILITY TAX ID :      [facility]  [address] [facility] [address] [phone]          DEPT CONTACT:  Nestor Ceron  VZ#[phone]  JAIDEN#[phone]       Patient Name :Yumiko Liriano   : 1995 (26 yrs)  MRN : [id number]     Patient Mailing Address [id number] [place] [address] [address] , [id number]                    Insurance Plan Payor: Charlene Jobs / Plan: Taz Skinner PPO / Product Type: PPO /      Primary Coverage Subscriber ID : [id number]     Secondary Coverage:  N/A         Current Patient Class : OBSERVATION  Admit Date : 3/7/2022     REQUESTED LEVEL OF CARE: OBSERVATION                                                            Diagnosis : DKA (diabetic ketoacidosis) ([facility] [address]                          ICD10 Code : D[facility]di[facility]) [facility] [E11.10]     Current Room and Bed 440/01     Admitting and Attending Info:  Admitting Provider : Philip Pham MD   NPI: [id number]  Admitting Provider Phone.  [phone]  Admitting Provider Address: [address] [place] [address]        Utilization Reviews         Diabetes - Care Day 1 (3/7/2022) by Genesis Garcia       Review Entered Review Status   3/8/2022 14:08 Completed      Criteria Review      Care Day: 1 Care Date: 3/7/2022 Level of Care: Intermediate Care    Guideline Day 1    Level Of Care    (X) ICU or floor    (X) Discharge planning    3/8/2022 14:08:33 EST by Genesis Garcia      cm to assess for dc needs    Clinical Status    (X) * Clinical Indications met    (X) Hyperglycemic and possibly acidotic    3/8/2022 14:08:33 EST by Genesis Garcia      3/7 530 am na 130 k 4.3  co2 11  glucose 298 , creat 1.13 hemoglobin A1 c 9,0 ,anion gap 19    6 am glucose 279,  UA neg bacteria,> 1000 glucose, > 80 ketones  716 am glucose 207,   823 am glucose 181 , 923 am glucose 156,  1029 am glucose 177,    Routes    (X) IV fluids    3/8/2022 14:08:33 EST by Roney Wilks      3/7 d5 ns 125 hr  9am to 621 pm   iv ns 100hr 3/7 6pm till   3/8 147 pm    (X) Begin oral hydration as tolerated    3/8/2022 14:08:33 EST by Roney Wilks      diabetic diet carb contolled    Interventions    (X) IV volume replacement    (X) Serum glucose, serum ketones, chemistries, ABGs or venous pH measurements, urinalysis    3/8/2022 14:08:33 EST by July Rosario see lab in additional notes    (X) Bedside glucose monitoring every 1 to 2 hours    Medications    (X) Possible IV insulin    3/8/2022 14:08:33 EST by Roney Wilks      iv insulin gtt titrate  4.9 to 0.9 units hr   3/7 937 pm dc    (X) Transition to subcutaneous insulin    3/8/2022 14:08:33 EST by Roney Wilks      3/7 10 units lantus  9 pm    * Milestone   Additional Notes   3/7/22 328 am presents to ED    Triage note Pt arrives from home with c/o N/V and chest pain starting at 2:30 AM. Pt reports DM1 with insulin pump. Pt checked sugar and it was 376. BG remained in 360's after giving self Novolog 5.6 units. Pt gave an additional bolus dose of 4.4 units. Pt BG in triage is 375.     T 97.7 p 99 r 16 to 23, bp 126/77 sats 95% ra pain 3/10    Ph 7.10  , co2 32, . glucose 375 ,     Na 127,  co2 11 glucose 364, create 1.38 , lipase 38    3/7 5 am glucose 307    3/7 530 am na 130 k 4.3  co2 11  glucose 298 , create 1.13 hemoglobin A1 c 9,0 ,anion gap 19    6 am glucose 279,  UA neg bacteria,> 1000 glucose, > 80 ketones  716 am glucose 207,   823 am glucose 181 , 923 am glucose 156,  1029 am glucose 177,  1127 am glucose 146    1130 labs na 134, k 4.0 g lcusoe 144  co2 20  creat 104    ( glucose range after 1p  154, 155, 152, 160, 150, 154, 255, 285,  223 range )   Cxr neg    Ekg nsr 100   Meds    Iv ns 1000cc then iv ns  125 hr     Iv Zofran 8mg    IV insulin gtt titrate 6.6 units hr       H&P   27 y.o. male w/ hx of DM 1 who presents to hospital with complains of nausea and vomiting.  States he had been in his usual state of health until 24 hours ago when he noted decreased appetite and has had poor p.o. intake.  States his first meal of the day was at 5 PM. [facility] did develop nausea with one episode of nonbilious nonbloody emesis.  Reports that his insulin pump/glucometer had began to read hypoglycemia with numbers in the 300s.  He self-administered adjusted doses of subcu insulin without any improvement in his blood glucose levels. At time of my evaluation, the patient denies any fever, chills, chest or abdominal pain, nausea, vomiting, cough, congestion, recent illness, palpitations, or dysuria. Remarkable vitals on ER Presentations: VSS   Labs Remarkable for: , nA 127, CR 13.8, co2-11, ph 7.1, elev hydroxybutyrate   ER Images: CXR no acute process   ER Rx: 1L NS bolus   Exam   General: Alert, cooperative, no distress, appears stated age. Head: Normocephalic, without obvious abnormality, atraumatic. Eyes: Conjunctivae/corneas clear. PERRL, EOMs intact. Nose: Nares normal. Septum midline. Mucosa normal.         Neck: Supple, symmetrical, trachea midline, no carotid bruit and no JVD.        Lungs:   Clear to auscultation bilaterally. Chest wall: No tenderness or deformity. Heart: Regular rate and rhythm, S1, S2 normal, no murmur, click, rub or gallop. Abdomen:   Soft, non-tender. Bowel sounds normal. No masses,  No organomegaly. Extremities: Extremities normal, atraumatic, no cyanosis or edema. Pulses: 2+ and symmetric all extremities.    Skin: Skin color, texture, turgor normal. No rashes or lesions   Neurologic: CNII-XII intact.         21 y.o. male w/ hx of DM 1 who is admitted for DKA    Plan:   Diabetic Ketoacidosis   -Admit to and monitor on IMCU   -Insulin gtt   -DC insulin pump   -DKA Protocol   -Serial BMPs   -Monitor and replete lytes       Hyponatremia   -Pseudohyponatremia   -Monitor with serial BMPs       Nausea and Vomiting Carlos MCBRIDE/GI - [email or web address] ml/hr   Activity - as tolerated   DVT prophylaxis - Lovenox   GI prophylaxis -  NI   Disposition - Home                    Diabetes - Clinical Indications for Admission to Inpatient Care by Roney Wilks       Review Entered Review Status   3/8/2022 14:02 Completed      Criteria Review      Clinical Indications for Admission to Inpatient Care    Most Recent : Roney Wilks Most Recent Date: 3/8/2022 14:02:46 EST    (X) Admission is indicated for  1 or more  of the following  (1) (2):       (X) Diabetic ketoacidosis that requires inpatient management, as indicated by Robert Gusman the following       :          (X) Hyperglycemia (eg, plasma glucose greater than 200 mg/dL (11.10 mmol/L)) [A]          3/8/2022 14:02:46 EST by Roney Wilks            glucose 375  see additonal reading in additional notes          (X) Ketonuria or ketonemia (eg, serum beta hydroxybutyrate greater than 3 mmol/L, or moderate          to large ketonuria) [B]          3/8/2022 14:02:46 EST by Roney Wilks            > 80 ketones          (X) Acidosis (eg, arterial or venous pH less than 7.30, or serum bicarbonate less than 15 mEq/L          (mmol/L)) [C]          3/8/2022 14:02:46 EST by Roney Wilks            ph 7.10  anion gap 19          (X) Inpatient management appropriate, as indicated by  1 or more  of the following :             (X) Arterial or venous pH less than 7.20 [C]             3/8/2022 14:02:46 EST by Roney Wilks               ph 7.10       H&P Notes       H&P by Ananth Hernandez MD at 03/07/22 0501 documented on ED to Hosp-Admission (Discharged) from 3/7/2022 in [facility] 4W TELEMETRY    Author: Ananth Hernandez MD Author Type: Physician Filed: 03/07/22 0655   Note Status: Signed Cosign: Cosign Not Required Date of Service: 03/07/22 0501   : Ananth Hernandez MD (Physician)               Expand AllCollapse All                                         History & Physical     Primary Care Provider: Fransisca Tsai MD  Source of Information: Patient and chart review         History of Presenting Illness:   Dimple Looney is a 27 y.o. male w/ hx of DM 1 who presents to hospital with complains of nausea and vomiting. States he had been in his usual state of health until 24 hours ago when he noted decreased appetite and has had poor p.o. intake. States his first meal of the day was at 5 PM.  He did develop nausea with one episode of nonbilious nonbloody emesis. Reports that his insulin pump/glucometer had began to read hypoglycemia with numbers in the 300s. He self-administered adjusted doses of subcu insulin without any improvement in his blood glucose levels. At time of my evaluation, the patient denies any fever, chills, chest or abdominal pain, nausea, vomiting, cough, congestion, recent illness, palpitations, or dysuria. Remarkable vitals on ER Presentations: VSS  Labs Remarkable for: , nA 127, CR 13.8, co2-11, ph 7.1, elev hydroxybutyrate  ER Images: CXR no acute process  ER Rx: 1L NS bolus       Review of Systems:  A comprehensive review of systems was negative except for that written in the History of Present Illness.           Past Medical History:   Diagnosis Date    DM type 1 (diabetes mellitus, type 1) ([facility])      Vitamin D deficiency              Past Surgical History:   Procedure Laterality Date    HX TYMPANOSTOMY   1999              Prior to Admission medications    Medication Sig Start Date End Date Taking? Authorizing Provider   cholecalciferol (VITAMIN D3) 25 mcg (1,000 unit) cap 2,000 IU = 2 CAP each dose, PO, daily, # 90 CAP, 1 Refills, Pharmacy: [id number] 3/29/21     Provider, Historical   insulin aspart U-100 (NovoLOG U-100 Insulin aspart) 100 unit/mL injection USE AS DIRECTED INFUSE VIA INSULIN PUMP.  MAX DOSE 100 UNITS PER DAY 10/23/21     Subhash Dickinson MD   Blood-Glucose Meter [facility]) monitoring kit Use to test blood sugars 4 times per day. DX Code: E10.9 2/1/18     Tadeo Torres MD   glucose blood VI test strips (ASCENSIA AUTODISC VI, ONE TOUCH ULTRA TEST VI) strip Check blood sugar 4x/day, Diagnosis E10.9 12/26/17     Tadeo Torres MD   Lancets ([facility]) Misc To use up to 100 units daily 10/16/12     Sara Goldman MD           Allergies   Allergen Reactions    Latex Rash            Family History   Problem Relation Age of Onset    Alcohol abuse Paternal Grandfather           cancer, type 2    Thyroid Disease Paternal Grandmother      Diabetes Neg Hx           SOCIAL HISTORY:  Patient resides:  Independently x   Assisted Living     SNF     With family care         Smoking history:   None x   Former     Chronic        Alcohol history:   None x   Social     Chronic        Ambulates:   Independently x   w/cane     w/walker     w/wc     CODE STATUS:  DNR     Full x   Other        Objective:      Physical Exam:      Visit Vitals  /77 (BP 1 Location: Left upper arm, BP Patient Position: At rest)   Pulse 99   Temp 97.7 °F (36.5 °C)   Resp 16   Ht 5' 11\" (1.803 m)   Wt 65.8 kg (145 lb)   SpO2 100%   BMI 20.22 kg/m²      O2 Device: None (Room air)     General:  Alert, cooperative, no distress, appears stated age. Head:  Normocephalic, without obvious abnormality, atraumatic. Eyes:  Conjunctivae/corneas clear. PERRL, EOMs intact. Nose: Nares normal. Septum midline. Mucosa normal.          Neck: Supple, symmetrical, trachea midline, no carotid bruit and no JVD.         Lungs:   Clear to auscultation bilaterally. Chest wall:  No tenderness or deformity. Heart:  Regular rate and rhythm, S1, S2 normal, no murmur, click, rub or gallop. Abdomen:   Soft, non-tender. Bowel sounds normal. No masses,  No organomegaly. Extremities: Extremities normal, atraumatic, no cyanosis or edema. Pulses: 2+ and symmetric all extremities.    Skin: Skin color, texture, turgor normal. No rashes or lesions   Neurologic: CNII-XII intact.          Data Review:      Recent Days:      Recent Labs     03/07/22  0346   WBC 11.1   HGB 17.0   HCT 49.5             Recent Labs     03/07/22  0346   *   K 4.1   CL 95*   CO2 11*   *   BUN 18   CREA 1.38*   CA 9.4   ALB 4.9   ALT 15      No results for input(s): PH, PCO2, PO2, HCO3, FIO2 in the last 72 hours.     24 Hour Results:  Recent Results          Recent Results (from the past 24 hour(s))   EKG, 12 LEAD, INITIAL     Collection Time: 03/07/22  3:37 AM   Result Value Ref Range     Ventricular Rate 100 BPM     Atrial Rate 100 BPM     P-R Interval 144 ms     QRS Duration 80 ms     Q-T Interval 370 ms     QTC Calculation (Bezet) 477 ms     Calculated P Axis 77 degrees     Calculated R Axis 81 degrees     Calculated T Axis 62 degrees     Diagnosis           Normal sinus rhythm  Right atrial enlargement  Borderline ECG  When compared with ECG of 21-OCT-2021 21:28,  No significant change was found      GLUCOSE, POC     Collection Time: 03/07/22  3:39 AM   Result Value Ref Range     Glucose (POC) 375 (H) 65 - 117 mg/dL     Performed by Rony Jarvis     CBC WITH AUTOMATED DIFF     Collection Time: 03/07/22  3:46 AM   Result Value Ref Range     WBC 11.1 4.1 - 11.1 K/uL     RBC 5.06 4.10 - 5.70 M/uL     HGB 17.0 12.1 - 17.0 g/dL     HCT 49.5 36.6 - 50.3 %     MCV 97.8 80.0 - 99.0 FL     MCH 33.6 26.0 - 34.0 PG     MCHC 34.3 30.0 - 36.5 g/dL     RDW 11.0 (L) 11.5 - 14.5 %     PLATELET 131 190 - 153 K/uL     MPV 11.0 8.9 - 12.9 FL     NRBC 0.0 0  WBC     ABSOLUTE NRBC 0.00 0.00 - 0.01 K/uL     NEUTROPHILS 61 32 - 75 %     LYMPHOCYTES 30 12 - 49 %     MONOCYTES 6 5 - 13 %     EOSINOPHILS 1 0 - 7 %     BASOPHILS 1 0 - 1 %     IMMATURE GRANULOCYTES 1 (H) 0.0 - 0.5 %     ABS. NEUTROPHILS 6.9 1.8 - 8.0 K/UL     ABS. LYMPHOCYTES 3.3 0.8 - 3.5 K/UL     ABS. MONOCYTES 0.7 0.0 - 1.0 K/UL     ABS. EOSINOPHILS 0.1 0.0 - 0.4 K/UL     ABS.  BASOPHILS 0.1 0.0 - 0.1 K/UL     ABS. IMM. GRANS. 0.1 (H) 0.00 - 0.04 K/UL     DF AUTOMATED     METABOLIC PANEL, COMPREHENSIVE     Collection Time: 03/07/22  3:46 AM   Result Value Ref Range     Sodium 127 (L) 136 - 145 mmol/L     Potassium 4.1 3.5 - 5.1 mmol/L     Chloride 95 (L) 97 - 108 mmol/L     CO2 11 (LL) 21 - 32 mmol/L     Anion gap 21 (H) 5 - 15 mmol/L     Glucose 364 (H) 65 - 100 mg/dL     BUN 18 6 - 20 MG/DL     Creatinine 1.38 (H) 0.70 - 1.30 MG/DL     BUN/Creatinine ratio 13 12 - 20       GFR est AA >60 >60 ml/min/1.73m2     GFR est non-AA >60 >60 ml/min/1.73m2     Calcium 9.4 8.5 - 10.1 MG/DL     Bilirubin, total 0.6 0.2 - 1.0 MG/DL     ALT (SGPT) 15 12 - 78 U/L     AST (SGOT) 16 15 - 37 U/L     Alk. phosphatase 125 (H) 45 - 117 U/L     Protein, total 8.6 (H) 6.4 - 8.2 g/dL     Albumin 4.9 3.5 - 5.0 g/dL     Globulin 3.7 2.0 - 4.0 g/dL     A-G Ratio 1.3 1.1 - 2.2     LIPASE     Collection Time: 03/07/22  3:46 AM   Result Value Ref Range     Lipase 38 (L) 73 - 393 U/L   AMYLASE     Collection Time: 03/07/22  3:46 AM   Result Value Ref Range     Amylase 32 25 - 115 U/L   PROCALCITONIN     Collection Time: 03/07/22  3:46 AM   Result Value Ref Range     Procalcitonin <0.05 ng/mL   C REACTIVE PROTEIN, QT     Collection Time: 03/07/22  3:46 AM   Result Value Ref Range     C-Reactive protein <0.29 0.00 - 0.60 mg/dL   SAMPLES BEING HELD     Collection Time: 03/07/22  3:46 AM   Result Value Ref Range     SAMPLES BEING HELD 1blu       COMMENT           Add-on orders for these samples will be processed based on acceptable specimen integrity and analyte stability, which may vary by analyte.    BETA-HYDROXYBUTYRATE     Collection Time: 03/07/22  3:46 AM   Result Value Ref Range     B-hydroxybutyrate >4.42 (H) <0.40 mmol/L   BLOOD GAS,LACTIC ACID, POC     Collection Time: 03/07/22  3:57 AM   Result Value Ref Range     pH (POC) 7.10 (LL) 7.35 - 7.45       pCO2 (POC) 36.0 35.0 - 45.0 MMHG     pO2 (POC) 32 (LL) 80 - 100 MMHG     HCO3 (POC) 11.3 (L) 22 - 26 MMOL/L     sO2 (POC) 43.1 (L) 92 - 97 %     Base deficit (POC) 17.6 mmol/L     Specimen type (POC) VENOUS BLOOD       Performed by Cecille Burton       Lactic Acid (POC) 1.85 0.40 - 2.00 mmol/L               Imaging:      Assessment:      Vane Bautista is a 27 y.o. male w/ hx of DM 1 who is admitted for DKA         Plan:         Diabetic Ketoacidosis  -Admit to and monitor on IMCU  -Insulin gtt  -DC insulin pump  -DKA Protocol  -Serial BMPs  -Monitor and replete lytes     Hyponatremia  -Pseudohyponatremia  -Monitor with serial BMPs     Nausea and Vomiting  -Zofran prn              FEN/GI -  [email or web address] ml/hr  Activity - as tolerated  DVT prophylaxis - Lovenox  GI prophylaxis -  NI  Disposition - Home     CODE STATUS:  Full code         Signed By: Pamela Bosch MD      March 7, 2022

## 2022-04-08 ENCOUNTER — OFFICE VISIT (OUTPATIENT)
Dept: URGENT CARE | Age: 27
End: 2022-04-08

## 2022-04-08 VITALS
TEMPERATURE: 99.2 F | HEIGHT: 71 IN | BODY MASS INDEX: 19.18 KG/M2 | HEART RATE: 87 BPM | RESPIRATION RATE: 16 BRPM | SYSTOLIC BLOOD PRESSURE: 135 MMHG | OXYGEN SATURATION: 98 % | WEIGHT: 137 LBS | DIASTOLIC BLOOD PRESSURE: 84 MMHG

## 2022-04-08 DIAGNOSIS — S93.402A SPRAIN OF LEFT ANKLE, UNSPECIFIED LIGAMENT, INITIAL ENCOUNTER: Primary | ICD-10-CM

## 2022-04-08 PROCEDURE — 99214 OFFICE O/P EST MOD 30 MIN: CPT | Performed by: NURSE PRACTITIONER

## 2022-04-08 NOTE — PROGRESS NOTES
23 yo male here for left ankle pain  Onset 1 day ago  Caught friend who fell  Noted pain after this  No recollection of twisting ankle  Pain 4/10 with ambulation. Improved with rest  Does hurt with walking. No numnbess or tingling. Past Medical History:   Diagnosis Date    DM type 1 [facility]di[facility] mellitus, type 1) ([facility] [address]     Vitamin D deficiency         Past Surgical History:   Procedure Laterality Date    HX TYMPANOSTOMY           Family History   Problem Relation Age of Onset    Alcohol abuse Paternal Grandfather         cancer, type 2    Thyroid Disease Paternal Grandmother     Diabetes Neg Hx         Social History     Socioeconomic History    Marital status: SINGLE     Spouse name: Not on file    Number of children: Not on file    Years of education: Not on file    Highest education level: Not on file   Occupational History    Not on file   Tobacco Use    Smoking status: Former Smoker     Quit date: 2017     Years since quittin.6    Smokeless tobacco: Former User    Tobacco comment: VAPES   Vaping Use    Vaping Use: Not on file   Substance and Sexual Activity    Alcohol use: No    Drug use: No    Sexual activity: Never     Partners: Female   Other Topics Concern    Not on file   Social History Narrative    Not on file     Social Determinants of Health     Financial Resource Strain:     Difficulty of Paying Living Expenses: Not on file   Food Insecurity:     Worried About [address] [email or web address] in the Last Year: Not on file    Saleem of Food in the Last Year: Not on file   Transportation Needs:     Lack of Transportation (Medical): Not on file    Lack of Transportation (Non-Medical):  Not on file   Physical Activity:     Days of Exercise per Week: Not on file    Minutes of Exercise per Session: Not on file   Stress:     Feeling of Stress : Not on file   Social Connections:     Frequency of Communication with Friends and Family: Not on file    Frequency of Social Gatherings with Friends and Family: Not on file    Attends Taoism Services: Not on file    Active Member of Clubs or Organizations: Not on file    Attends Club or Organization Meetings: Not on file    Marital Status: Not on file   Intimate Partner Violence:     Fear of Current or Ex-Partner: Not on file    Emotionally Abused: Not on file    Physically Abused: Not on file    Sexually Abused: Not on file   Housing Stability:     Unable to Pay for Housing in the Last Year: Not on file    Number of [place] in the Last Year: Not on file    Unstable Housing in the Last Year: Not on file                ALLERGIES: Latex    Review of Systems   All other systems reviewed and are negative. Vitals:    04/08/22 1524   BP: 135/84   Pulse: 87   Resp: 16   Temp: 99.2 °F (37.3 °C)   SpO2: 98%   Weight: 137 lb (62.1 kg)   Height: 5' 11\" (1.803 m)       Physical Exam  Vitals reviewed. Constitutional:       General: He is not in acute distress. Appearance: He is well-developed. HENT:      Head: Normocephalic and atraumatic. Cardiovascular:      Rate and Rhythm: Normal rate and regular rhythm. Heart sounds: Normal heart sounds. Pulmonary:      Effort: Pulmonary effort is normal.      Breath sounds: Normal breath sounds. Musculoskeletal:      Left ankle: No swelling, deformity, ecchymosis or lacerations. Tenderness present. Normal range of motion. Anterior drawer test negative. Normal pulse. Left Achilles Tendon: No tenderness or defects. Gudino's test negative. Legs:    Skin:     General: Skin is warm and dry. Neurological:      Mental Status: He is alert and oriented to person, place, and time. Psychiatric:         Behavior: Behavior normal.         Thought Content:  Thought content normal.         Judgment: Judgment normal.         MDM     Differential Diagnosis; Clinical Impression; Plan:       CLINICAL IMPRESSION:  (P38.136H) Sprain of left ankle, unspecified ligament, initial encounter  (primary encounter diagnosis)        Plan:  Left ankle sprain  No acute fracture or abnormality on x ray and no concerning strength or neurovascular deficits. Compresses, elevation  OTC naproxen as directed  See ortho if pain persists past next 2 weeks      We have reviewed concerning signs/symptoms, normal vs abnormal progression of medical condition and when to seek immediate medical attention. Schedule with PCP or Urgent Care immediately for worsening or new symptoms. Procedures         XR Results (most recent):  Results from Appointment encounter on 04/08/22    XR ANKLE LT MIN 3 V    Narrative  EXAM: XR ANKLE LT MIN 3 V    INDICATION: Left ankle pain after injury one day ago. COMPARISON: None. FINDINGS: Three views of the left ankle demonstrate no acute fracture or  dislocation. The joint spaces are maintained. The soft tissues are unremarkable. Impression  No acute abnormality.

## 2022-04-08 NOTE — PATIENT INSTRUCTIONS
XR Results (most recent):  Results from Appointment encounter on 04/08/22    XR ANKLE LT MIN 3 V    Narrative  EXAM: XR ANKLE LT MIN 3 V    INDICATION: Left ankle pain after injury one day ago. COMPARISON: None. FINDINGS: Three views of the left ankle demonstrate no acute fracture or  dislocation. The joint spaces are maintained. The soft tissues are unremarkable. Impression  No acute abnormality. Ankle Sprain: Care Instructions  Your Care Instructions     An ankle sprain can happen when you twist your ankle. The ligaments that support the ankle can get stretched and torn. Often the ankle is swollen and painful. Ankle sprains may take from several weeks to several months to heal. Usually, the more pain and swelling you have, the more severe your ankle sprain is and the longer it will take to heal. You can heal faster and regain strength in your ankle with good home treatment. It is very important to give your ankle time to heal completely, so that you do not easily hurt your ankle again. Follow-up care is a key part of your treatment and safety. Be sure to make and go to all appointments, and call your doctor if you are having problems. It's also a good idea to know your test results and keep a list of the medicines you take. How can you care for yourself at home? · Prop up your foot on pillows as much as possible for the next 3 days. Try to keep your ankle above the level of your heart. This will help reduce the swelling. · Follow your doctor's directions for wearing a splint or elastic bandage. Wrapping the ankle may help reduce or prevent swelling. · Your doctor may give you a splint, a brace, an air stirrup, or another form of ankle support to protect your ankle until it is healed. Wear it as directed while your ankle is healing. Do not remove it unless your doctor tells you to. After your ankle has healed, ask your doctor whether you should wear the brace when you exercise.   · Put ice or cold packs on your injured ankle for 10 to 20 minutes at a time. Try to do this every 1 to 2 hours for the next 3 days (when you are awake) or until the swelling goes down. Put a thin cloth between the ice and your skin. · You may need to use crutches until you can walk without pain. If you do use crutches, try to bear some weight on your injured ankle if you can do so without pain. This helps the ankle heal.  · Take pain medicines exactly as directed. ? If the doctor gave you a prescription medicine for pain, take it as prescribed. ? If you are not taking a prescription pain medicine, ask your doctor if you can take an over-the-counter medicine. · If you have been given ankle exercises to do at home, do them exactly as instructed. These can promote healing and help prevent lasting weakness. When should you call for help? Call your doctor now or seek immediate medical care if:    · Your pain is getting worse.     · Your swelling is getting worse.     · Your splint feels too tight or you are unable to loosen it. Watch closely for changes in your health, and be sure to contact your doctor if:    · You are not getting better after 1 week. Where can you learn more? Go to http://www.gray.com/  Enter K273 in the search box to learn more about \"Ankle Sprain: Care Instructions. \"  Current as of: July 1, 2021               Content Version: 13.2  © 9217-2583 "[employer]. Care instructions adapted under license by [employer] (which disclaims liability or warranty for this information). If you have questions about a medical condition or this instruction, always ask your healthcare professional. [address] any warranty or liability for your use of this information.

## 2023-04-03 PROBLEM — E11.10 DKA (DIABETIC KETOACIDOSIS) (HCC): Status: RESOLVED | Noted: 2021-10-21 | Resolved: 2022-03-08

## 2025-04-07 NOTE — ED NOTES
Attempted to call report to  Room 440, was told the nurse will call back for report. The patient's goals for the shift include wants to rest    The clinical goals for the shift include no signs of sepsis spreading this shift